# Patient Record
Sex: MALE | Race: WHITE | NOT HISPANIC OR LATINO | Employment: FULL TIME | URBAN - METROPOLITAN AREA
[De-identification: names, ages, dates, MRNs, and addresses within clinical notes are randomized per-mention and may not be internally consistent; named-entity substitution may affect disease eponyms.]

---

## 2019-03-14 NOTE — PROGRESS NOTES
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     Seen by Dr. Ritesh Medrano O.D. 2/2019 while living with sister in [place] for the past 2 years. Told he had glaucoma and cataracts.  Office #: [phone]  Fax #: [phone]    Currently back living in [place]. Noticing that he has some blurry vision, intermittent in both eyes. Current glasses over 3 years old. Lower part of the vision \"is not doing well\" in both eyes over the past 6 months.     Doesn't drive currently.     POH:  Last eye exam: 6/2016 - Dr. Velazquez at [place]    Prior eye surgery/laser: none  CTL wearer: 2 week disposables; does not sleep in lenses  Glasses: bifocals    Fam hx of eye disease: mother- glaucoma    -hx of OHTN left eye after trauma - reports multiple episodes of head trauma and black eyes as a teenager and young adult    (per prior records from [facility]):     -Tmax 29     -previous HVF's \"normal\"     -mother and maybe grandmother with +hx of glaucoma     -gonio (2012) - no angle recession     -pachy: 596 left eye     Gtts:  Lumigan at bedtime each eye -- ran out about 3 days ago and restarted some old latanoprost at bedtime each eye    All:  NKDA    A/P:  1. Ocular hypertension, left eye   -diagnosed as ocular hypertension left eye 2/2 prior head trauma by hx, although no angle recession noted on prior gonio exams  -C:D asymmetry left eye>OD with ? Hx of head trauma  -OCT nerve shows inferior thinning left eye; normal right eye -- suggestive of glaucomatous changes  -IOP higher left eye (21) vs 15 today right eye -- on latanoprost each eye  -Tmax per prior records 29 off gtts left eye  -pachy thick each eye  -positive fam hx   -will obtain baseline gonio, HVF 24-2 at next visit - no dilation -- to determine status of glaucoma    2. Nuclear sclerosis, each eye  -visually significant left eye>OD  -monitor for now until " glaucoma status determined after additional testing    3. Hypertension  -mild attenuation of retinal vessels each eye with no CWS, heme   -recommend tight BP control  -f/u with PCP, med compliance reviewed  -annual DFE      F/u 4-6 weeks for HVF 24-2, gonio - no dilation.    Lynnette Buchanan MD  PGY-5, Cornea Fellow  Ophthalmology    Complete documentation of historical and exam elements from today's encounter can be found in the full encounter summary report (not reduplicated in this progress note). I personally obtained the chief complaint(s) and history of present illness.  I confirmed and edited as necessary the review of systems, past medical/surgical history, family history, social history, and examination findings as documented by others; and I examined the patient myself. I personally reviewed the relevant tests, images, and reports as documented above. I formulated and edited as necessary the assessment and plan and discussed the findings and management plan with the patient and family. I was present for all procedures performed during this visit.    Lynnette Buchanan MD  Cornea Fellow

## 2019-03-15 ENCOUNTER — OFFICE VISIT (OUTPATIENT)
Dept: OPHTHALMOLOGY | Facility: CLINIC | Age: 59
End: 2019-03-15
Attending: OPHTHALMOLOGY
Payer: MEDICAID

## 2019-03-15 ENCOUNTER — ANCILLARY PROCEDURE (OUTPATIENT)
Dept: GENERAL RADIOLOGY | Facility: CLINIC | Age: 59
End: 2019-03-15
Attending: FAMILY MEDICINE
Payer: COMMERCIAL

## 2019-03-15 ENCOUNTER — OFFICE VISIT (OUTPATIENT)
Dept: ORTHOPEDICS | Facility: CLINIC | Age: 59
End: 2019-03-15
Payer: COMMERCIAL

## 2019-03-15 DIAGNOSIS — H25.13 NUCLEAR SCLEROTIC CATARACT OF BOTH EYES: ICD-10-CM

## 2019-03-15 DIAGNOSIS — H40.1120 PRIMARY OPEN ANGLE GLAUCOMA OF LEFT EYE, UNSPECIFIED GLAUCOMA STAGE: ICD-10-CM

## 2019-03-15 DIAGNOSIS — M54.12 CERVICAL RADICULOPATHY: Primary | ICD-10-CM

## 2019-03-15 DIAGNOSIS — H40.051 BORDERLINE GLAUCOMA OF RIGHT EYE WITH OCULAR HYPERTENSION: Primary | ICD-10-CM

## 2019-03-15 PROBLEM — B19.20 HEPATITIS C: Status: ACTIVE | Noted: 2019-03-15

## 2019-03-15 PROCEDURE — 76514 ECHO EXAM OF EYE THICKNESS: CPT | Mod: ZF | Performed by: OPHTHALMOLOGY

## 2019-03-15 PROCEDURE — G0463 HOSPITAL OUTPT CLINIC VISIT: HCPCS | Mod: ZF

## 2019-03-15 PROCEDURE — 92133 CPTRZD OPH DX IMG PST SGM ON: CPT | Mod: ZF | Performed by: OPHTHALMOLOGY

## 2019-03-15 RX ORDER — AMLODIPINE BESYLATE 10 MG/1
TABLET ORAL
COMMUNITY
Start: 2017-02-21

## 2019-03-15 RX ORDER — PREDNISONE 20 MG/1
40 TABLET ORAL DAILY
Qty: 10 TABLET | Refills: 0 | Status: SHIPPED | OUTPATIENT
Start: 2019-03-15 | End: 2019-04-02

## 2019-03-15 RX ORDER — LATANOPROST 50 UG/ML
1 SOLUTION/ DROPS OPHTHALMIC DAILY
Qty: 1 BOTTLE | Refills: 3 | Status: SHIPPED | OUTPATIENT
Start: 2019-03-15 | End: 2019-06-18

## 2019-03-15 RX ORDER — ATORVASTATIN CALCIUM 20 MG/1
20 TABLET, FILM COATED ORAL EVERY EVENING
COMMUNITY
Start: 2016-07-26

## 2019-03-15 ASSESSMENT — REFRACTION_WEARINGRX
OS_CYLINDER: SPHERE
OS_ADD: +1.50
OD_SPHERE: -6.75
OD_CYLINDER: +0.50
OS_SPHERE: -6.25
OD_AXIS: 135
OD_ADD: +1.50

## 2019-03-15 ASSESSMENT — VISUAL ACUITY
CORRECTION_TYPE: GLASSES
OS_CC+: -2
OS_CC: 20/60
METHOD: SNELLEN - LINEAR
OD_CC: 20/20
OS_PH_CC: 20/40

## 2019-03-15 ASSESSMENT — EXTERNAL EXAM - RIGHT EYE: OD_EXAM: NORMAL

## 2019-03-15 ASSESSMENT — CONF VISUAL FIELD
OS_NORMAL: 1
METHOD: COUNTING FINGERS
OD_NORMAL: 1

## 2019-03-15 ASSESSMENT — SLIT LAMP EXAM - LIDS
COMMENTS: NORMAL
COMMENTS: NORMAL

## 2019-03-15 ASSESSMENT — TONOMETRY
IOP_METHOD: TONOPEN
OS_IOP_MMHG: 21
OD_IOP_MMHG: 15

## 2019-03-15 ASSESSMENT — EXTERNAL EXAM - LEFT EYE: OS_EXAM: NORMAL

## 2019-03-15 ASSESSMENT — CUP TO DISC RATIO
OD_RATIO: 0.3
OS_RATIO: 0.6

## 2019-03-15 ASSESSMENT — PACHYMETRY
OD_CT(UM): 592
OS_CT(UM): 601

## 2019-03-15 NOTE — PATIENT INSTRUCTIONS
Eye Drop Instructions:    1. RESTART Latanoprost (Xalatan - green top) - 1 drop in the left eye once daily.  2. START Preservative-free lubricating tears (i.e Refresh) - 1 drop every 3-4 hours, as needed for irritation/dryness or discomfort.

## 2019-03-15 NOTE — PROGRESS NOTES
"[facility]  Orthopedics  Balaji Asencio MD  2019     Name: Stas Houston  MRN: [id number]  Age: 58 year old  : 1960  Referring provider: Referred Self     Chief Complaint: Left shoulder pain    History of Present Illness:   Stas Houston is a 58 year old, right handed male with a past medical history of shoulder impingement and a this is rthroscopy in 2018 who presents today for evaluation of his left shoulder pain with some radicular symptoms into his forearm. His pain is aching and shooting in nature, and it gets worse in the AM. He has some pain at the volar aspect of his hand. His pain is exacerbated with movement, overhead motion, and when he has excess clothes on due to the weight of the clothes. His pain is slightly relieved with rest and light movement. He admits to stiff necks and some nerve pain from sleeping \"wrong\" but not neck pain specifically. He has taken Ibuprofen for pain relief with some relief at first He has not tried PT or a CSI.    Review of Systems:   A 10-point review of systems was obtained and is negative except for as noted in the HPI.     Medications:   Current Outpatient Medications:      latanoprost (XALATAN) 0.005 % ophthalmic solution, Place 1 drop Into the left eye daily, Disp: 1 Bottle, Rfl: 3    Allergies:  Pollen extract    Past Medical History:  Glaucoma suspect  Ocular hypertension    Past Surgical History:  The patient does not have any pertinent past surgical history.    Social History:  The patient is a current everyday smoker.    Family History:  Glaucoma ([place])    Physical Examination:  Alert, pleasant and conversational    General: alert, pleasant, no distress  Neck/Spine: no TTP of spinous processes in cervical spine.  Pain with neck extension.  R OM limited in tilting and rotation of the neck.  Negative TTP along paraspinous muscles and upper trapezius musculature. negative Periscapular pain.  negative tightness in this area. No noted bruising or skin " discoloration. Spurlings negative  Neurologic: SILT throughout upper extremities. Strength 5/5 and symmetric throughout upper extremities.   Upper Extremities: warm, well perfused, no edema. Full ROM without pain in shoulder, elbow, wrist, and hand. Good capillary refill bilaterally    Imaging:   XR cervical spine - 2/3 views (3/15/19)  Impression:  Severe degenerative arthritic changes in the mid cervical spine more  pronounced on the right. Anterolisthesis of C4 on C5 as well as C5 on  C6.    I have independently reviewed the above imaging studies; the results were discussed with the patient.     Assessment:   58 year old, right handed male with left shoulder pain suspected to be radicular in etiology.    Plan:   Reviewed imaging and assessment with the patient in detail  Recommended course of treatment with prednisone.  Given home exercises.  We will discuss physical therapy referral if he is unable to improve on his own  If there is no improvement in symptoms after 1 week, consider MRI for further evaluation of cervical spine.    Balaji Asencio MD      Scribe Disclosure:   I, Grady Campbell, am serving as a scribe to document services personally performed by Balaji Asencio MD at this visit, based upon the provider's statements to me. All documentation has been reviewed by the aforementioned provider prior to being entered into the official medical record.

## 2019-03-15 NOTE — PROGRESS NOTES
SPORTS & ORTHOPEDIC WALK-IN VISIT 3/15/2019    Primary Care Physician: Dr. English  Hx of shoulder impingement had shoulder scope in 2/2018  Has not done PT. Had not had a hx of CSI  Pain is worse in the AM, does have some radicul s/s    Reason for visit:     What part of your body is injured / painful?  left shoulder    What caused the injury /pain? No inciting event     How long ago did your injury occur or pain begin? problem is longstanding    What are your most bothersome symptoms? Pain    How would you characterize your symptom?  aching and shooting    What makes your symptoms better? Rest    What makes your symptoms worse? Movement and Overhead motion    Have you been previously seen for this problem? Yes, [place]    Medical History:    Any recent changes to your medical history? No    Any new medication prescribed since last visit? No    Have you had surgery on this body part before? Yes    Social History:    Occupation: - [employer]    Handedness: Right      Review of Systems:    Do you have fever, chills, weight loss? No    Do you have any vision problems? No    Do you have any chest pain or edema? No    Do you have any shortness of breath or wheezing?  No    Do you have stomach problems? No    Do you have any numbness or focal weakness? No    Do you have diabetes? No    Do you have problems with bleeding or clotting? No    Do you have an rashes or other skin lesions? No

## 2019-03-15 NOTE — NURSING NOTE
Chief Complaints and History of Present Illnesses   Patient presents with     Yearly Exam     Chief Complaint(s) and History of Present Illness(es)     Yearly Exam     Laterality: both eyes    Onset: gradual    Onset: years ago    Quality: Feels that the va has decreased      Frequency: constantly    Associated symptoms: Negative for floaters, flashes, dryness and tearing    Pain scale: 0/10              Comments     No glare  Krystle DE LEON 12:39 PM March 15, 2019

## 2019-03-18 ENCOUNTER — DOCUMENTATION ONLY (OUTPATIENT)
Dept: CARE COORDINATION | Facility: CLINIC | Age: 59
End: 2019-03-18

## 2019-04-02 ENCOUNTER — ANCILLARY PROCEDURE (OUTPATIENT)
Dept: MRI IMAGING | Facility: CLINIC | Age: 59
End: 2019-04-02
Attending: FAMILY MEDICINE
Payer: COMMERCIAL

## 2019-04-02 ENCOUNTER — OFFICE VISIT (OUTPATIENT)
Dept: ORTHOPEDICS | Facility: CLINIC | Age: 59
End: 2019-04-02
Payer: COMMERCIAL

## 2019-04-02 VITALS
DIASTOLIC BLOOD PRESSURE: 76 MMHG | BODY MASS INDEX: 28.12 KG/M2 | WEIGHT: 175 LBS | HEIGHT: 66 IN | SYSTOLIC BLOOD PRESSURE: 126 MMHG

## 2019-04-02 DIAGNOSIS — M54.12 CERVICAL RADICULOPATHY: ICD-10-CM

## 2019-04-02 DIAGNOSIS — M54.12 CERVICAL RADICULOPATHY: Primary | ICD-10-CM

## 2019-04-02 RX ORDER — TRAMADOL HYDROCHLORIDE 50 MG/1
50 TABLET ORAL EVERY 6 HOURS PRN
Qty: 15 TABLET | Refills: 0 | Status: SHIPPED | OUTPATIENT
Start: 2019-04-02 | End: 2019-04-12

## 2019-04-02 ASSESSMENT — MIFFLIN-ST. JEOR: SCORE: 1566.54

## 2019-04-02 NOTE — PROGRESS NOTES
CHIEF COMPLAINT:  Pain of the Left Shoulder and RECHECK (Shoulder pain. )       HISTORY OF PRESENT ILLNESS  Mr. Houston is a pleasant 58 year old year old male who presents to clinic today for follow-up of bilateral arm pain.  He was originally seen by Dr. Asencio for symptoms consistent with cervical radiculopathy.  He had an MRI done about an hour ago that he would like to review.      Additional history: as documented    MEDICAL HISTORY  Patient Active Problem List   Diagnosis     Acid reflux     Altered mental status, unspecified altered mental status type     Anxiety disorder     Avoidant personality disorder (H)     Choroidal nevus     Essential hypertension     Glaucoma suspect, left eye     Hepatitis C     Astigmatism     Major depressive disorder, recurrent episode, mild (H)     Mild major depression (H)     Retinal lattice degeneration       Current Outpatient Medications   Medication Sig Dispense Refill     traMADol (ULTRAM) 50 MG tablet Take 1 tablet (50 mg) by mouth every 6 hours as needed for severe pain 15 tablet 0     amLODIPine (NORVASC) 10 MG tablet TAKE 1 TABLET BY MOUTH DAILY       atorvastatin (LIPITOR) 20 MG tablet Take 20 mg by mouth       latanoprost (XALATAN) 0.005 % ophthalmic solution Place 1 drop Into the left eye daily 1 Bottle 3       Allergies   Allergen Reactions     Pollen Extract Other (See Comments)       Family History   Problem Relation Age of Onset     Glaucoma Mother      Diabetes No family hx of        Additional medical/Social/Surgical histories reviewed in [facility] and updated as appropriate.     REVIEW OF SYSTEMS (4/2/2019)  CONSTITUTIONAL: Denies fever and weight loss  EYES: Denies acute vision changes  ENT: Denies hearing changes or difficulty swallowing  CARDIAC: Denies chest pain or edema  RESPIRATORY: Denies dyspnea, cough or wheeze  GASTROINTESTINAL: Denies abdominal pain, nausea, vomiting  MUSCULOSKELETAL: See HPI  SKIN: Denies any recent rash or lesion  NEUROLOGICAL:  "Denies numbness or focal weakness  PSYCHIATRIC: No history of psychiatric symptoms or problems  ENDOCRINE: Denies current diagnosis of diabetes  HEMATOLOGY: Denies episodes of easy bleeding      PHYSICAL EXAM  Vitals:    04/02/19 1457   BP: 126/76   Weight: 79.4 kg (175 lb)   Height: 1.676 m (5' 6\")     General  - normal appearance, in no obvious distress  CV  - normal peripheral perfusion  Pulm  - normal respiratory pattern, non-labored  Musculoskeletal - cervical spine  - inspection: leftward lateral prominence   - palpation: no paravertebral or bony tenderness  - ROM: pain with bilateral rotation and sidebending  - strength: upper extremities 5/5 in all planes    Neuro  - C5-7 DTRs 2+ bilaterally, no sensory or motor deficit, grossly normal coordination, normal muscle tone  Skin  - no ecchymosis, erythema, warmth, or induration, no obvious rash  Psych  - interactive, appropriate, normal mood and affect             ASSESSMENT & PLAN  Mr. Houston is a 58 year old year old male who presents to clinic today with bilateral arm pain consistent with cervical radiculopathy..    I reviewed his MR in the room with him which reads:  1. Multilevel cervical spondylosis, most pronounced at C4-5 with  moderate to severe spinal canal stenosis and at C3-4 with moderate  spinal canal stenosis.  2. There is Moderate to severe right neural foraminal stenosis at C4-5  and severe right neural foraminal stenosis at C3-4. Further  degenerative disease as above.  3. Levoconvex scoliosis of the cervical spine, with its apex at C3-4.    Stas and I had a good discussion regarding non-operative treatment for degenerative disc disease.  This included strengthening of the paraspinal and core muscles, weight control, and oral analgesics when needed.  We also discussed the role of epidural corticosteroid injections.    I am referring him for a cervical injection, specifically at C4-5 on the left.  He is going to follow-up with Dr. Asencio 2 weeks " after his injection.  If his symptoms are persistent we could attempt an injection at a subsequent level or contralateral side.    I also prescribed him a short course of tramadol.    It was a pleasure seeing Stas today.      Neto Islas DO, [facility]  Primary Care Sports Medicine

## 2019-04-02 NOTE — LETTER
4/2/2019       RE: Stas Houston  [address] [address]     Dear Colleague,    Thank you for referring your patient, Stas Houston, to the [facility] SPORTS AND ORTHOPAEDIC WALK IN CLINIC at [facility]. Please see a copy of my visit note below.    CHIEF COMPLAINT:  Pain of the Left Shoulder and RECHECK (Shoulder pain. )     HISTORY OF PRESENT ILLNESS  Mr. Houston is a pleasant 58 year old year old male who presents to clinic today for follow-up of bilateral arm pain.  He was originally seen by Dr. Asencio for symptoms consistent with cervical radiculopathy.  He had an MRI done about an hour ago that he would like to review.    Additional history: as documented    MEDICAL HISTORY  Patient Active Problem List   Diagnosis     Acid reflux     Altered mental status, unspecified altered mental status type     Anxiety disorder     Avoidant personality disorder (H)     Choroidal nevus     Essential hypertension     Glaucoma suspect, left eye     Hepatitis C     Astigmatism     Major depressive disorder, recurrent episode, mild (H)     Mild major depression (H)     Retinal lattice degeneration       Current Outpatient Medications   Medication Sig Dispense Refill     traMADol (ULTRAM) 50 MG tablet Take 1 tablet (50 mg) by mouth every 6 hours as needed for severe pain 15 tablet 0     amLODIPine (NORVASC) 10 MG tablet TAKE 1 TABLET BY MOUTH DAILY       atorvastatin (LIPITOR) 20 MG tablet Take 20 mg by mouth       latanoprost (XALATAN) 0.005 % ophthalmic solution Place 1 drop Into the left eye daily 1 Bottle 3       Allergies   Allergen Reactions     Pollen Extract Other (See Comments)       Family History   Problem Relation Age of Onset     Glaucoma Mother      Diabetes No family hx of        Additional medical/Social/Surgical histories reviewed in [facility] and updated as appropriate.     REVIEW OF SYSTEMS (4/2/2019)  CONSTITUTIONAL: Denies fever and weight loss  EYES:  "Denies acute vision changes  ENT: Denies hearing changes or difficulty swallowing  CARDIAC: Denies chest pain or edema  RESPIRATORY: Denies dyspnea, cough or wheeze  GASTROINTESTINAL: Denies abdominal pain, nausea, vomiting  MUSCULOSKELETAL: See HPI  SKIN: Denies any recent rash or lesion  NEUROLOGICAL: Denies numbness or focal weakness  PSYCHIATRIC: No history of psychiatric symptoms or problems  ENDOCRINE: Denies current diagnosis of diabetes  HEMATOLOGY: Denies episodes of easy bleeding      PHYSICAL EXAM  Vitals:    04/02/19 1457   BP: 126/76   Weight: 79.4 kg (175 lb)   Height: 1.676 m (5' 6\")     General  - normal appearance, in no obvious distress  CV  - normal peripheral perfusion  Pulm  - normal respiratory pattern, non-labored  Musculoskeletal - cervical spine  - inspection: leftward lateral prominence   - palpation: no paravertebral or bony tenderness  - ROM: pain with bilateral rotation and sidebending  - strength: upper extremities 5/5 in all planes    Neuro  - C5-7 DTRs 2+ bilaterally, no sensory or motor deficit, grossly normal coordination, normal muscle tone  Skin  - no ecchymosis, erythema, warmth, or induration, no obvious rash  Psych  - interactive, appropriate, normal mood and affect    ASSESSMENT & PLAN  Mr. Houston is a 58 year old year old male who presents to clinic today with bilateral arm pain consistent with cervical radiculopathy..    I reviewed his MR in the room with him which reads:  1. Multilevel cervical spondylosis, most pronounced at C4-5 with  moderate to severe spinal canal stenosis and at C3-4 with moderate  spinal canal stenosis.  2. There is Moderate to severe right neural foraminal stenosis at C4-5  and severe right neural foraminal stenosis at C3-4. Further  degenerative disease as above.  3. Levoconvex scoliosis of the cervical spine, with its apex at C3-4.    Stas and I had a good discussion regarding non-operative treatment for degenerative disc disease.  This included " strengthening of the paraspinal and core muscles, weight control, and oral analgesics when needed.  We also discussed the role of epidural corticosteroid injections.    I am referring him for a cervical injection, specifically at C4-5 on the left.  He is going to follow-up with Dr. Asencio 2 weeks after his injection.  If his symptoms are persistent we could attempt an injection at a subsequent level or contralateral side.    I also prescribed him a short course of tramadol.    It was a pleasure seeing Stas today.    Neto Islas DO, [facility]  Primary Care Sports Medicine          SPORTS & ORTHOPEDIC WALK-IN FOLLOW-UP VISIT 4/2/2019    Patient is here after getting a cervical MRI. States his Left shoulder is painful. Pain radiates down left arm into hand. All fingers are affected. Pain also moves across shoulders/ upper back into the Right shoulder.   Interval History:     Follow up reason: Kamryn English    Date of injury: N/A    Date last seen: 3/15/19 with Dr. Asencio    Following Therapeutic Plan: Yes     Pain: Worsening    Function: Worsening    Medical History:    Any recent changes to your medical history? No    Any new medication prescribed since last visit? No    Review of Systems:    Do you have fever, chills, weight loss? No    Do you have any vision problems? No    Do you have any chest pain or edema? No    Do you have any shortness of breath or wheezing?  No    Do you have stomach problems? No    Do you have any numbness or focal weakness? No    Do you have diabetes? No    Do you have problems with bleeding or clotting? No    Do you have an rashes or other skin lesions? No

## 2019-04-02 NOTE — LETTER
April 2, 2019    RE: Stas Houston  [address] [place] [address]        Dear  or ,    Mr. Houston is under my professional care for a neck condition.    With regards to his medications, he is no longer taking lumigan or prednisone.    Regards,            John Paul Islas DO [facility]

## 2019-04-02 NOTE — PROGRESS NOTES
SPORTS & ORTHOPEDIC WALK-IN FOLLOW-UP VISIT 4/2/2019    Patient is here after getting a cervical MRI. States his Left shoulder is painful. Pain radiates down left arm into hand. All fingers are affected. Pain also moves across shoulders/ upper back into the Right shoulder.   Interval History:     Follow up reason: Kamryn English    Date of injury: N/A    Date last seen: 3/15/19 with Dr. Asencio    Following Therapeutic Plan: Yes     Pain: Worsening    Function: Worsening    Medical History:    Any recent changes to your medical history? No    Any new medication prescribed since last visit? No    Review of Systems:    Do you have fever, chills, weight loss? No    Do you have any vision problems? No    Do you have any chest pain or edema? No    Do you have any shortness of breath or wheezing?  No    Do you have stomach problems? No    Do you have any numbness or focal weakness? No    Do you have diabetes? No    Do you have problems with bleeding or clotting? No    Do you have an rashes or other skin lesions? No

## 2019-04-12 ENCOUNTER — OFFICE VISIT (OUTPATIENT)
Dept: ORTHOPEDICS | Facility: CLINIC | Age: 59
End: 2019-04-12
Payer: COMMERCIAL

## 2019-04-12 VITALS — WEIGHT: 175 LBS | HEIGHT: 66 IN | RESPIRATION RATE: 16 BRPM | BODY MASS INDEX: 28.12 KG/M2

## 2019-04-12 DIAGNOSIS — M54.12 CERVICAL RADICULOPATHY: Primary | ICD-10-CM

## 2019-04-12 RX ORDER — HYDROCORTISONE 2.5 %
CREAM (GRAM) TOPICAL
Status: ON HOLD | COMMUNITY
Start: 2019-03-21 | End: 2019-05-03

## 2019-04-12 RX ORDER — DULOXETIN HYDROCHLORIDE 60 MG/1
60 CAPSULE, DELAYED RELEASE ORAL
COMMUNITY
Start: 2019-04-02 | End: 2019-05-07

## 2019-04-12 RX ORDER — GABAPENTIN 300 MG/1
CAPSULE ORAL
COMMUNITY
Start: 2019-03-21

## 2019-04-12 RX ORDER — TRAMADOL HYDROCHLORIDE 50 MG/1
50 TABLET ORAL EVERY 6 HOURS PRN
Qty: 20 TABLET | Refills: 0 | Status: ON HOLD | OUTPATIENT
Start: 2019-04-12 | End: 2019-05-03

## 2019-04-12 ASSESSMENT — MIFFLIN-ST. JEOR: SCORE: 1566.54

## 2019-04-12 NOTE — PROGRESS NOTES
"[facility]  Orthopedics  Balaji Asencio MD  2019     Name: Stas Houston  MRN: [id number]  Age: 58 year old  : 1960  Referring provider: Referred Self     Chief Complaint: Neck pain follow-up    History of Present Illness:   Stas Houston is a 58 year old, male with a past medical history of shoulder impingement and arthroscopy in 2018 who presents today for follow-up regarding his left neck and shoulder pain consistent with cervical radiculopathy. The patient was last evaluated by Dr. Islas on 19, at which time they discussed his MRI results and he referred to receive a cervical injection at C4-5 on left. Today, the patient reports that he has been doing well on Tramadol and has gone back to work. He continues a similar pain as before in his neck and shoulder which is exacerbated with left shoulder movement. The patient also would like to refill his Tramadol prescription which he takes 1/day in the morning. He also has began using Ibuprofen, and he uses Gabapentin for anxiety.     Review of Systems:   A 10-point review of systems was obtained and is negative except for as noted in the HPI.     Physical Examination:  Resp. rate 16, height 1.676 m (5' 6\"), weight 79.4 kg (175 lb).  Deferred exam.     Imaging:  MRI cervical spine w/o contrast (19)  1. Multilevel cervical spondylosis, most pronounced at C4-5 with  moderate to severe spinal canal stenosis and at C3-4 with moderate  spinal canal stenosis.  2. There is Moderate to severe right neural foraminal stenosis at C4-5  and severe right neural foraminal stenosis at C3-4. Further  degenerative disease as above.  3. Levoconvex scoliosis of the cervical spine, with its apex at C3-4.  Per radiology.    I have independently reviewed the above imaging studies; the results were discussed with the patient.     Assessment:   58 year old, male with left neck and shoulder pain with bilateral arm (L>R) radiculopathy consistent with cervical " radiculopathy    Plan:   -Referred to PT for neck and shoulder exercises   -Refilled Tramadol prescription for until his neck injection. No further refills after that time.     Balaji Asencio MD       Scribe Disclosure:   I, Grady Campbell, am serving as a scribe to document services personally performed by Balaji Asencio MD at this visit, based upon the provider's statements to me. All documentation has been reviewed by the aforementioned provider prior to being entered into the official medical record.

## 2019-04-12 NOTE — PROGRESS NOTES
SPORTS & ORTHOPEDIC WALK-IN FOLLOW-UP VISIT 4/12/2019    Interval History:     Follow up reason: neck pain    Date of injury: NA    Date last seen: 4/2/19 Islas    Following Therapeutic Plan: Yes     Pain: Improving    Function: Improving    Interval History: Improving with the Tramadol. Pain is manageable. Goes to work with the Tramadol. Wondering about a refill.     Medical History:    Any recent changes to your medical history? No    Any new medication prescribed since last visit? No    Review of Systems:    Do you have fever, chills, weight loss? No    Do you have any vision problems? No    Do you have any chest pain or edema? No    Do you have any shortness of breath or wheezing?  No    Do you have stomach problems? No    Do you have any numbness or focal weakness? No    Do you have diabetes? No    Do you have problems with bleeding or clotting? No    Do you have an rashes or other skin lesions? No

## 2019-04-12 NOTE — LETTER
2019       RE: Stas Houston  [address] [address]     Dear Colleague,    Thank you for referring your patient, Stas Houston, to the [facility] SPORTS AND ORTHOPAEDIC WALK IN CLINIC at [facility]. Please see a copy of my visit note below.          SPORTS & ORTHOPEDIC WALK-IN FOLLOW-UP VISIT 2019    Interval History:     Follow up reason: neck pain    Date of injury: NA    Date last seen: 19 Roshan    Following Therapeutic Plan: Yes     Pain: Improving    Function: Improving    Interval History: Improving with the Tramadol. Pain is manageable. Goes to work with the Tramadol. Wondering about a refill.     Medical History:    Any recent changes to your medical history? No    Any new medication prescribed since last visit? No    Review of Systems:    Do you have fever, chills, weight loss? No    Do you have any vision problems? No    Do you have any chest pain or edema? No    Do you have any shortness of breath or wheezing?  No    Do you have stomach problems? No    Do you have any numbness or focal weakness? No    Do you have diabetes? No    Do you have problems with bleeding or clotting? No    Do you have an rashes or other skin lesions? No               [facility]  Orthopedics  Balaji Asencio MD  2019     Name: Stas Houston  MRN: [id number]  Age: 58 year old  : 1960  Referring provider: Referred Self     Chief Complaint: Neck pain follow-up    History of Present Illness:   Stas Houston is a 58 year old, male with a past medical history of shoulder impingement and arthroscopy in 2018  who presents today for follow-up regarding his left neck and shoulder pain consistent with cervical radiculopathy. The patient was last evaluated by Dr. Islas on 19, at which time they discussed his MRI results and he referred to receive a cervical injection at C4-5 on left. Today, the patient reports that he has been doing well on Tramadol  "and has gone back to work. He continues a similar pain as before in his neck and shoulder which is exacerbated with left shoulder movement. The patient also would like to refill his Tramadol prescription which he takes 1/day in the morning. He also has began using Ibuprofen, and he uses Gabapentin for anxiety.     Review of Systems:   A 10-point review of systems was obtained and is negative except for as noted in the HPI.     Physical Examination:  Resp. rate 16, height 1.676 m (5' 6\"), weight 79.4 kg (175 lb).  Deferred exam.     Imaging:  MRI cervical spine w/o contrast (4/2/19)  1. Multilevel cervical spondylosis, most pronounced at C4-5 with  moderate to severe spinal canal stenosis and at C3-4 with moderate  spinal canal stenosis.  2. There is Moderate to severe right neural foraminal stenosis at C4-5  and severe right neural foraminal stenosis at C3-4. Further  degenerative disease as above.  3. Levoconvex scoliosis of the cervical spine, with its apex at C3-4.  Per radiology.    I have independently reviewed the above imaging studies; the results were discussed with the patient.     Assessment:   58 year old, male with left neck and shoulder pain with bilateral arm (L>R) radiculopathy consistent with cervical radiculopathy    Plan:   -Referred to PT for neck and shoulder exercises   -Refilled Tramadol prescription for until his neck injection. No further refills after that time.     Balaji Asencio MD       Scribe Disclosure:   I, Grady Campbell, am serving as a scribe to document services personally performed by Balaji Asencio MD at this visit, based upon the provider's statements to me. All documentation has been reviewed by the aforementioned provider prior to being entered into the official medical record.          Again, thank you for allowing me to participate in the care of your patient.      Sincerely,    Balaji Asencio MD      "

## 2019-04-20 ENCOUNTER — OFFICE VISIT (OUTPATIENT)
Dept: OPHTHALMOLOGY | Facility: CLINIC | Age: 59
End: 2019-04-20
Payer: COMMERCIAL

## 2019-04-20 DIAGNOSIS — H40.052 BORDERLINE GLAUCOMA OF LEFT EYE WITH OCULAR HYPERTENSION: ICD-10-CM

## 2019-04-20 DIAGNOSIS — H25.13 NUCLEAR SCLEROTIC CATARACT OF BOTH EYES: Primary | ICD-10-CM

## 2019-04-20 ASSESSMENT — GONIOSCOPY
OS_TEMPORAL: OPEN TO CBB
OS_INFERIOR: OPEN TO CBB
OS_NASAL: OPEN TO CBB
OD_SUPERIOR: OPEN TO CBB
OS_SUPERIOR: OPEN TO CBB
OD_INFERIOR: OPEN TO CBB
OD_TEMPORAL: OPEN TO CBB
OD_NASAL: OPEN TO CBB

## 2019-04-20 ASSESSMENT — EXTERNAL EXAM - RIGHT EYE: OD_EXAM: NORMAL

## 2019-04-20 ASSESSMENT — SLIT LAMP EXAM - LIDS
COMMENTS: NORMAL
COMMENTS: NORMAL

## 2019-04-20 ASSESSMENT — PACHYMETRY
OS_CT(UM): 601
OD_CT(UM): 592

## 2019-04-20 ASSESSMENT — REFRACTION_WEARINGRX
OS_ADD: +1.50
OS_CYLINDER: SPHERE
OD_ADD: +1.50
OD_CYLINDER: +0.50
OS_SPHERE: -6.25
OS_SPHERE: -6.25
OS_CYLINDER: SPHERE
OD_AXIS: 135
OD_SPHERE: -6.75
OD_ADD: +1.50
OS_ADD: +1.50
OD_SPHERE: -6.75
OD_AXIS: 135
OD_CYLINDER: +0.50

## 2019-04-20 ASSESSMENT — EXTERNAL EXAM - LEFT EYE: OS_EXAM: NORMAL

## 2019-04-20 ASSESSMENT — TONOMETRY
IOP_METHOD: ICARE
OD_IOP_MMHG: 20
OS_IOP_MMHG: 16

## 2019-04-20 ASSESSMENT — VISUAL ACUITY
METHOD: SNELLEN - LINEAR
OD_CC: 20/20
OS_CC+: -2
CORRECTION_TYPE: GLASSES
OS_CC: 20/60

## 2019-04-20 NOTE — NURSING NOTE
Patient presents for 4-6wk f/u of Borderline glaucoma of right eye with ocular hypertension with Gonio and 24-2 VF. The current vision is stable. No pain, discomfort, no redness itching or tears. No f/f. Consistent with rx eye drops. Alba DE LEON 10:13 AM April 20, 2019

## 2019-04-20 NOTE — PROGRESS NOTES
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     Seen by Dr. Ritesh Merdano O.D. 2/2019 while living with sister in [place] for the past 2 years. Told he had glaucoma and cataracts.  Office #: [phone]  Fax #: [phone]    Currently back living in [place]. Noticing that he has some blurry vision, intermittent in both eyes. Current glasses over 3 years old. Lower part of the vision \"is not doing well\" in both eyes over the past 6 months.     Doesn't drive currently.     Interval: Here for 4-6 week follow up for additional glaucoma testing. Still bothered by decreased vision left eye.     POH:  Last eye exam: 6/2016 - Dr. Velazquez at [facility]    Prior eye surgery/laser: none  CTL wearer: 2 week disposables; does not sleep in lenses  Glasses: bifocals    Fam hx of eye disease: mother- glaucoma    -hx of OHTN left eye after trauma - reports multiple episodes of head trauma and black eyes as a teenager and young adult    (per prior records from [facility]):     -Tmax 29     -previous HVF's \"normal\"     -mother and maybe grandmother with +hx of glaucoma     -gonio (2012) - no angle recession     -pachy: 596 left eye     Glaucoma work up/testing:   -C:D (DFE 3/15/19): 0.3 right eye, 0.6 left eye    -Tmax (on lumigan/latanoprost): 20, 21   -pachy: 592/601   -positive fam hx of glaucoma   -gonio open each eye   -HVF 24-2 (4/20/19): 1st test, few non-specific defects   -OCT nerve (3/15/19): right eye: G360, left eye: inf thinning, otherwise green    Gtts:  Latanoprost at bedtime OU    All:  NKDA    A/P:  1. Glaucoma suspect, OU  -diagnosed as ocular hypertension left eye 2/2 prior head trauma by hx; also with C:D asymmetry (0.4 vs. 0.6)  -OCT nerve (3/15/19) - shows inferior thinning left eye; normal right eye  -Tmax per prior records 29 off gtts left eye  -IOP today 20, 16  -pachy thick each eye  -positive " fam hx   -HVF 24-2 (4/20/19): initial test, few non-specific defects; monitor  -gonio (4/20/19): open to CBB each eye 360 with few inf PAS left eye; no angle recession; can visualize peripheral iris vessels, but no heme/hyphema/branching vessels of concern; likely visualized due to light iris color -appear symmetric each eye.  -given low risk of latanoprost and improvement of IOP from 29 --> 16, would continue latanoprost at bedtime each eye  -recommend repeat OCT nerve/HVF in 6 months    2. Nuclear sclerosis, each eye  -visually significant left eye>OD  -pt bothered by decreased vision  -plan for CE/IOL left eye first  -target: emmetropia, pt aware he will wear reading glasses after surgery  -although mild NS right eye, will have disruption from aniseikonia, so will proceed with right eye CE/IOL a few weeks after left eye CE/IOL  -R/B/A reviewed  -30 minutes, MAC/top  -Corie to call and schedule  -pt will get IOL calcs at [facility] next week    3. Hypertension  -mild attenuation of retinal vessels each eye with no CWS, heme   -recommend tight BP control  -f/u with PCP, med compliance reviewed  -annual DFE    IOL calcs at [facility] next week  Corie to call and schedule - CE/IOL left eye first    Lynnette Buchanan MD  PGY-5, Cornea Fellow  Ophthalmology    Complete documentation of historical and exam elements from today's encounter can be found in the full encounter summary report (not reduplicated in this progress note). I personally obtained the chief complaint(s) and history of present illness.  I confirmed and edited as necessary the review of systems, past medical/surgical history, family history, social history, and examination findings as documented by others; and I examined the patient myself. I personally reviewed the relevant tests, images, and reports as documented above. I formulated and edited as necessary the assessment and plan and discussed the findings and management plan with the patient and family. I was  present for all procedures performed during this visit.    Lynnette Buchanan MD  Cornea Fellow

## 2019-04-22 ENCOUNTER — ALLIED HEALTH/NURSE VISIT (OUTPATIENT)
Dept: OPHTHALMOLOGY | Facility: CLINIC | Age: 59
End: 2019-04-22
Attending: OPHTHALMOLOGY
Payer: COMMERCIAL

## 2019-04-22 DIAGNOSIS — H26.9 CATARACT: Primary | ICD-10-CM

## 2019-04-22 PROCEDURE — 40000269 ZZH STATISTIC NO CHARGE FACILITY FEE: Mod: ZF

## 2019-04-22 NOTE — PROGRESS NOTES
IOL calcs reviewed and acceptable for surgery.     To see Corie today for surgical scheduling.    Lynnette Buchanan MD  PGY-5, Cornea Fellow  Ophthalmology

## 2019-04-23 ENCOUNTER — TELEPHONE (OUTPATIENT)
Dept: OPHTHALMOLOGY | Facility: CLINIC | Age: 59
End: 2019-04-23

## 2019-04-23 NOTE — TELEPHONE ENCOUNTER
Patient is scheduled for surgery with Dr. Buchanan    Spoke or left message with: patient    Date of Surgery: 5/8/19 and 5/29/19    Location: [facility]    Informed patient they will need an adult  Yes    Pre-op with surgeon (if applicable): DERIAN    H&P: Scheduled with PAC unit eval on 4/30/19    Additional imaging/appointments: Post op appointment scheduled.    Surgery packet: mailed to patient 4/23/19    Additional comments: Advised RN will call 1 - 3 days prior to each surgery date with arrival time and instructions.

## 2019-04-24 ENCOUNTER — THERAPY VISIT (OUTPATIENT)
Dept: PHYSICAL THERAPY | Facility: CLINIC | Age: 59
End: 2019-04-24
Payer: COMMERCIAL

## 2019-04-24 DIAGNOSIS — M54.2 CERVICAL PAIN: ICD-10-CM

## 2019-04-24 DIAGNOSIS — M54.12 CERVICAL RADICULOPATHY: ICD-10-CM

## 2019-04-24 PROCEDURE — 97161 PT EVAL LOW COMPLEX 20 MIN: CPT | Mod: GP | Performed by: PHYSICAL THERAPIST

## 2019-04-24 PROCEDURE — 97110 THERAPEUTIC EXERCISES: CPT | Mod: GP | Performed by: PHYSICAL THERAPIST

## 2019-04-24 NOTE — PROGRESS NOTES
[facility] Initial Evaluation  Subjective:  [facility]                  Physical Therapy Initial Examination/Evaluation  April 24, 2019    Stas Houston is a 59 year old male referred to physical therapy by Dr. Asencio for treatment of cervical pain-left lateral shoulder that can travel down the arm into hand, tingling and needle poking with Precautions/Restrictions/MD instructions none      Subjective:  DOI/onset: 1/1/2013  Acute Injury or Gradual Onset?: Gradual injury over time  Mechanism of Injury: unknown  Related PMH: chem dep, depression, hepatitis, high blood pressure, smoking Previous Treatment: Nothing Effect of prior treatment: NA  Imaging: x-ray and MRI  Chief Complaint/Functional Limitations:   Pain upon awakening and see below in therapy evaluation codes   Pain: rest 2 /10, activity 6/10 Location: left shoulder, neck Frequency: Constant Described as: aching, sharp and tingling Alleviated by: Rest Progression of Symptoms: Gradually getting worse. 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, sweeping floors, light work  Current HEP/exercise regimen: nothing  Patient's goals are see chief complaints pain and weakness left arm and cervical region    Other pertinent PMH/Red Flags: Chemical Dependency, Hepatitis   Barriers at home/work: None as reported by patient  Pertinent Surgical History: none  Medications: gabapentin  General health as reported by patient: good  Return to MD:  6-8 weeks      Objective:  System  Obs: flexed sitting posture, signficant forward head posture  Cervical AROM is full with all motions pain with right lat flex on the left side-UT  Cervical myotomes  5/5 all UE levels  Physical Exam    General     ROS    Assessment/Plan:    Patient is a 59 year old male with cervical complaints.    Patient has the following significant findings with corresponding  treatment plan.                Diagnosis 1:  Cervical radiculopathy  Pain -  hot/cold therapy, manual therapy, self management, education and home program  Decreased function - therapeutic activities  Impaired posture - neuro re-education    Therapy Evaluation Codes:   1) History comprised of:   Personal factors that impact the plan of care:      None.    Comorbidity factors that impact the plan of care are:      Smoking.     Medications impacting care: None.  2) Examination of Body Systems comprised of:   Body structures and functions that impact the plan of care:      Cervical spine.   Activity limitations that impact the plan of care are:      Driving, Lifting and Sitting.  3) Clinical presentation characteristics are:   Stable/Uncomplicated.  4) Decision-Making    Low complexity using standardized patient assessment instrument and/or measureable assessment of functional outcome.  Cumulative Therapy Evaluation is: Low complexity.    Previous and current functional limitations:  (See Goal Flow Sheet for this information)    Short term and Long term goals: (See Goal Flow Sheet for this information)     Communication ability:  Patient appears to be able to clearly communicate and understand verbal and written communication and follow directions correctly.  Treatment Explanation - The following has been discussed with the patient:   RX ordered/plan of care  Anticipated outcomes  Possible risks and side effects  This patient would benefit from PT intervention to resume normal activities.   Rehab potential is good.    Frequency:  1 X week, once daily  Duration:  for 8 weeks  Discharge Plan:  Achieve all LTG.  Independent in home treatment program.  Reach maximal therapeutic benefit.    Please refer to the daily flowsheet for treatment today, total treatment time and time spent performing 1:1 timed codes.

## 2019-04-26 ENCOUNTER — TELEPHONE (OUTPATIENT)
Dept: OPHTHALMOLOGY | Facility: CLINIC | Age: 59
End: 2019-04-26

## 2019-05-01 ENCOUNTER — TELEPHONE (OUTPATIENT)
Dept: INTERVENTIONAL RADIOLOGY/VASCULAR | Facility: CLINIC | Age: 59
End: 2019-05-01

## 2019-05-03 ENCOUNTER — APPOINTMENT (OUTPATIENT)
Dept: MEDSURG UNIT | Facility: CLINIC | Age: 59
End: 2019-05-03
Attending: FAMILY MEDICINE
Payer: COMMERCIAL

## 2019-05-03 ENCOUNTER — HOSPITAL ENCOUNTER (OUTPATIENT)
Facility: CLINIC | Age: 59
Discharge: HOME | End: 2019-05-03
Attending: FAMILY MEDICINE | Admitting: FAMILY MEDICINE
Payer: COMMERCIAL

## 2019-05-03 ENCOUNTER — HOSPITAL ENCOUNTER (OUTPATIENT)
Dept: GENERAL RADIOLOGY | Facility: CLINIC | Age: 59
End: 2019-05-03
Attending: FAMILY MEDICINE | Admitting: FAMILY MEDICINE
Payer: COMMERCIAL

## 2019-05-03 ENCOUNTER — PRE VISIT (OUTPATIENT)
Facility: CLINIC | Age: 59
End: 2019-05-03

## 2019-05-03 VITALS
HEIGHT: 66 IN | SYSTOLIC BLOOD PRESSURE: 143 MMHG | OXYGEN SATURATION: 98 % | BODY MASS INDEX: 28.12 KG/M2 | DIASTOLIC BLOOD PRESSURE: 83 MMHG | WEIGHT: 175 LBS | HEART RATE: 68 BPM | TEMPERATURE: 97.6 F | RESPIRATION RATE: 16 BRPM

## 2019-05-03 DIAGNOSIS — M54.12 CERVICAL RADICULOPATHY: ICD-10-CM

## 2019-05-03 LAB
APTT PPP: 29 SEC (ref 22–37)
INR PPP: 1 (ref 0.86–1.14)
PLATELET # BLD AUTO: 237 10E9/L (ref 150–450)

## 2019-05-03 PROCEDURE — 85730 THROMBOPLASTIN TIME PARTIAL: CPT | Performed by: STUDENT IN AN ORGANIZED HEALTH CARE EDUCATION/TRAINING PROGRAM

## 2019-05-03 PROCEDURE — 62321 NJX INTERLAMINAR CRV/THRC: CPT

## 2019-05-03 PROCEDURE — 85610 PROTHROMBIN TIME: CPT | Performed by: STUDENT IN AN ORGANIZED HEALTH CARE EDUCATION/TRAINING PROGRAM

## 2019-05-03 PROCEDURE — 25000128 H RX IP 250 OP 636: Performed by: RADIOLOGY

## 2019-05-03 PROCEDURE — 25000125 ZZHC RX 250: Performed by: RADIOLOGY

## 2019-05-03 PROCEDURE — 25500064 ZZH RX 255 OP 636: Performed by: RADIOLOGY

## 2019-05-03 PROCEDURE — 40000166 ZZH STATISTIC PP CARE STAGE 1

## 2019-05-03 PROCEDURE — 85049 AUTOMATED PLATELET COUNT: CPT | Performed by: STUDENT IN AN ORGANIZED HEALTH CARE EDUCATION/TRAINING PROGRAM

## 2019-05-03 RX ORDER — LIDOCAINE HYDROCHLORIDE 10 MG/ML
10 INJECTION, SOLUTION EPIDURAL; INFILTRATION; INTRACAUDAL; PERINEURAL ONCE
Status: COMPLETED | OUTPATIENT
Start: 2019-05-03 | End: 2019-05-03

## 2019-05-03 RX ORDER — DEXTROSE MONOHYDRATE 25 G/50ML
25-50 INJECTION, SOLUTION INTRAVENOUS
Status: DISCONTINUED | OUTPATIENT
Start: 2019-05-03 | End: 2019-05-03 | Stop reason: HOSPADM

## 2019-05-03 RX ORDER — IOPAMIDOL 408 MG/ML
20 INJECTION, SOLUTION INTRATHECAL ONCE
Status: COMPLETED | OUTPATIENT
Start: 2019-05-03 | End: 2019-05-03

## 2019-05-03 RX ORDER — BETAMETHASONE SODIUM PHOSPHATE AND BETAMETHASONE ACETATE 3; 3 MG/ML; MG/ML
6 INJECTION, SUSPENSION INTRA-ARTICULAR; INTRALESIONAL; INTRAMUSCULAR; SOFT TISSUE ONCE
Status: COMPLETED | OUTPATIENT
Start: 2019-05-03 | End: 2019-05-03

## 2019-05-03 RX ORDER — NICOTINE POLACRILEX 4 MG
15-30 LOZENGE BUCCAL
Status: DISCONTINUED | OUTPATIENT
Start: 2019-05-03 | End: 2019-05-03 | Stop reason: HOSPADM

## 2019-05-03 RX ORDER — BETAMETHASONE SODIUM PHOSPHATE AND BETAMETHASONE ACETATE 3; 3 MG/ML; MG/ML
2 INJECTION, SUSPENSION INTRA-ARTICULAR; INTRALESIONAL; INTRAMUSCULAR; SOFT TISSUE ONCE
Status: DISCONTINUED | OUTPATIENT
Start: 2019-05-03 | End: 2019-05-03

## 2019-05-03 RX ADMIN — BETAMETHASONE SODIUM PHOSPHATE AND BETAMETHASONE ACETATE 3 ML: 3; 3 INJECTION, SUSPENSION INTRA-ARTICULAR; INTRALESIONAL; INTRAMUSCULAR; SOFT TISSUE at 13:52

## 2019-05-03 RX ADMIN — LIDOCAINE HYDROCHLORIDE 3 ML: 10 INJECTION, SOLUTION EPIDURAL; INFILTRATION; INTRACAUDAL; PERINEURAL at 13:51

## 2019-05-03 RX ADMIN — IOPAMIDOL 3 ML: 408 INJECTION, SOLUTION INTRATHECAL at 13:52

## 2019-05-03 ASSESSMENT — MIFFLIN-ST. JEOR: SCORE: 1561.29

## 2019-05-03 NOTE — TELEPHONE ENCOUNTER
FUTURE VISIT INFORMATION      SURGERY INFORMATION:    Date: 5/8/19, 5/29/19    [facility]    Surgeon:  Lynnette Buchanan    Anesthesia Type:  Combined MAC with Topical    RECORDS REQUESTED FROM:       Primary Care Provider: Veronica Munson [facility]- requested records/ testing 5/3    Pertinent Medical History: Hypertension

## 2019-05-03 NOTE — IP AVS SNAPSHOT
MRN:[id number]                      After Visit Summary   5/3/2019    Stas Houston    MRN: [id number]           Visit Information        Department      5/3/2019 11:11 AM [address] [facility]          Review of your medicines      UNREVIEWED medicines. Ask your doctor about these medicines       Dose / Directions   amLODIPine 10 MG tablet  Commonly known as:  NORVASC      TAKE 1 TABLET BY MOUTH DAILY  Refills:  0     atorvastatin 20 MG tablet  Commonly known as:  LIPITOR      Dose:  20 mg  Take 20 mg by mouth  Refills:  0     DULoxetine 60 MG capsule  Commonly known as:  CYMBALTA      Dose:  60 mg  Take 60 mg by mouth  Refills:  0     gabapentin 300 MG capsule  Commonly known as:  NEURONTIN      Take 1 pill qhs for one week. If not too sleepy after one week, increase to BID.  Refills:  0     latanoprost 0.005 % ophthalmic solution  Commonly known as:  XALATAN  Used for:  Borderline glaucoma of right eye with ocular hypertension      Dose:  1 drop  Place 1 drop Into the left eye daily  Quantity:  1 Bottle  Refills:  3              Protect others around you: Learn how to safely use, store and throw away your medicines at www.disposemymeds.org.       Follow-ups after your visit       Your next 10 appointments already scheduled    May 07, 2019  2:30 PM CDT  (Arrive by 2:15 PM)  PAC EVALUATION with AICHA Rosas  [facility] [place] ([facility]) [address] [place] [address]  [phone]      May 07, 2019  3:30 PM CDT  (Arrive by 3:15 PM)  PAC RN ASSESSMENT with Sanju Pac Rn  [facility] [place] ([facility]) [address] [place] [address]  [phone]      May 07, 2019  4:00 PM CDT  (Arrive by 3:45 PM)  PAC Anesthesia Consult with Sanju Pac Anesthesiologist  [facility] [place] ([facility]) [facility]  [address] [address]  [phone]      May 08, 2019  Procedure with Lynnette Buchanan MD  [facility] ([facility] [place]) [address] [place] [phone]  [phone]   Located in the Clinics and Surgery Center at [address] [place] [address].   parking is very convenient and highly recommended.  is a flat rate fee.  Both  and self parkers should enter the main arrival plaza from [facility]; parking attendants will direct you based on your parking preference. Visit [email or web address] for more details.   May 09, 2019 10:15 AM CDT  Post-Op with Lynnette Buchanan MD  [facility] Clinic ([facility]) [address] [address]  [phone]      May 20, 2019  4:00 PM CDT  Return Walk In Ortho with Balaji Asencio MD  [facility] ([facility]) [address] [place] [address]  [phone]      May 21, 2019 10:15 AM CDT  Post-Op with Lynnette Buchanan MD  Eye Clinic ([facility]) [address] [address]  [phone]      May 29, 2019  Procedure with Lynnette Buchanan MD  [facility] ([facility]) [address] [place] [phone]  [phone]   Located in the Clinics and Surgery Center at [address] [place] [address].   parking is very convenient and highly recommended.  is a flat rate fee.  Both  and self parkers should enter the main arrival plaza from [facility]; parking attendants will direct you based on your parking preference. Visit [email or web address] for more details.   May 30, 2019  8:30 AM CDT  Post-Op with Lynnette  Kailee Buchanan MD  Eye Clinic ([facility]) [facility]  [address]  [address] [address]  [facility] [address]  [phone]      Jun 04, 2019 10:00 AM CDT  Post-Op with Lynnette Buchanan MD  [facility] Clinic ([facility]) [facility]  [address]  [address] [address]  [facility] [address]  [phone]         Care Instructions       Further instructions from your care team       [facility]    Discharge Instructions for Epidural Steroid Injection    Care of injection site:    If you have new numbness down your leg after the injection, this may last up to 4-6 hours, but should go away. You may need help with walking until your leg feels normal.    Over the next 24 to 48 hour, pain at the injection site may increase before it gets better.    For the next 48 hours, use ice packs for 15 minutes,3-4 times a day for pain relief.    If you have a bandage, you may remove it the next morning            Activity:    Limit you activity based on you pain level. Follow the doctor's orders for activity.    You may eat a normal diet.  Medicines:    Restart all your medicines today at your regular dose, including Coumadin (Warfarin).    If you are restarting Coumadin, talk to your doctor about having your INR checked.    Restart you platelet inhibitors and aspirin tomorrow.  For steroid shots: The steroid should help reduce swelling and pain. This may take from 3-14 days. Side effects from the shot will be mild and go away in 2-3 days.   Common side effects may include:    Insomnia    Heartburn    Flushed face    Water retention    Increased appetite    Increased blood sugar  If you have diabetes, watch you blood sugar closely, If needed, call your doctor to help control you blood sugar.    Some patient will get lasting relief from a single injection. Others may require up to 3  injections to get results. If you have more than one steroid injection,  "they should be given 2 weeks apart. Some patients do not receive relief of symptoms.    Call your doctor or go to the Emergency Room if you have severe pain, fever, or problems with your bowel or bladder control.    [facility] RADIOLOGY DEPARTMENT               Procedure Physician:  Dr. Fernandez                         Date:May 3, 2019               Telephone Numbers:  [phone]   After 4:30PM on weekdays, weekends and Holidays. Ask for the Radiologist on call. Someone is available 24 hours a day.          Additional Information About Your Visit       Care EveryWhere ID    This is your Care EveryWhere ID. This could be used by other organizations to access your [facility] medical records  [id number]       Your Vitals Were     Blood Pressure   143/83          Pulse   68          Temperature   97.6  F (36.4  C)          Respirations   16          Height   1.676 m (5' 5.98\")             Weight   79.4 kg (175 lb)    Pulse Oximetry   98%    BMI (Body Mass Index)   28.26 kg/m           Primary Care Provider Office Phone # Fax #    Veronica Munson [phone]      Equal Access to Services    [facility]USTAVO : Hadii aad ku hadasho Soomaali, waaxda luqadaha, qaybta kaalmada adeegyada, waxay idiin hayaan leilani kharacarmen kapoor . So [facility] [phone].    ATENCIÓN: Si habla español, tiene a galindo disposición servicios gratuitos de asistencia lingüística. Llame al [phone].    We comply with applicable federal civil rights laws and [place] laws. We do not discriminate on the basis of race, color, national origin, age, disability, sex, sexual orientation, or gender identity.           Thank you!    Thank you for choosing [facility] for your care. Our goal is always to provide you with excellent care. Hearing back from our patients is one way we can continue to improve our services. Please take a few minutes to complete the written survey that you may receive in the mail after you visit with us. Thank you!          "   Medication List      ASK your doctor about these medications          Morning Afternoon Evening Bedtime As Needed    amLODIPine 10 MG tablet  Also known as:  NORVASC  INSTRUCTIONS:  TAKE 1 TABLET BY MOUTH DAILY                     atorvastatin 20 MG tablet  Also known as:  LIPITOR  INSTRUCTIONS:  Take 20 mg by mouth                     DULoxetine 60 MG capsule  Also known as:  CYMBALTA  INSTRUCTIONS:  Take 60 mg by mouth                     gabapentin 300 MG capsule  Also known as:  NEURONTIN  INSTRUCTIONS:  Take 1 pill qhs for one week. If not too sleepy after one week, increase to BID.                     latanoprost 0.005 % ophthalmic solution  Also known as:  XALATAN  INSTRUCTIONS:  Place 1 drop Into the left eye daily

## 2019-05-03 NOTE — PROGRESS NOTES
Pt arrived back to unit 2A at 1420.  Alert and oriented.  VSS.  Pain is per baseline.  Pt eating.  Mid neck posterior dressing dry and intact.  Discharge instructions went over with patient.  Pt will discharge home at 1445.

## 2019-05-03 NOTE — DISCHARGE INSTRUCTIONS
[facility]    Discharge Instructions for Epidural Steroid Injection    Care of injection site:    If you have new numbness down your leg after the injection, this may last up to 4-6 hours, but should go away. You may need help with walking until your leg feels normal.    Over the next 24 to 48 hour, pain at the injection site may increase before it gets better.    For the next 48 hours, use ice packs for 15 minutes,3-4 times a day for pain relief.    If you have a bandage, you may remove it the next morning            Activity:    Limit you activity based on you pain level. Follow the doctor's orders for activity.    You may eat a normal diet.  Medicines:    Restart all your medicines today at your regular dose, including Coumadin (Warfarin).    If you are restarting Coumadin, talk to your doctor about having your INR checked.    Restart you platelet inhibitors and aspirin tomorrow.  For steroid shots: The steroid should help reduce swelling and pain. This may take from 3-14 days. Side effects from the shot will be mild and go away in 2-3 days.   Common side effects may include:    Insomnia    Heartburn    Flushed face    Water retention    Increased appetite    Increased blood sugar  If you have diabetes, watch you blood sugar closely, If needed, call your doctor to help control you blood sugar.    Some patient will get lasting relief from a single injection. Others may require up to 3  injections to get results. If you have more than one steroid injection, they should be given 2 weeks apart. Some patients do not receive relief of symptoms.    Call your doctor or go to the Emergency Room if you have severe pain, fever, or problems with your bowel or bladder control.    [facility] RADIOLOGY DEPARTMENT               Procedure Physician:  Dr. Fernandez                         Date:May 3, 2019               Telephone Numbers:  [phone]   After 4:30PM on weekdays, weekends and Holidays. Ask for the  Radiologist on call. Someone is available 24 hours a day.

## 2019-05-03 NOTE — IP AVS SNAPSHOT
[address] [address]                                    After Visit Summary   5/3/2019    Stas Houston    MRN: [id number]           After Visit Summary Signature Page    I have received my discharge instructions, and my questions have been answered. I have discussed any challenges I see with this plan with the nurse or doctor.    ..........................................................................................................................................  Patient/Patient Representative Signature      ..........................................................................................................................................  Patient Representative Print Name and Relationship to Patient    ..................................................               ................................................  Date                                   Time    ..........................................................................................................................................  Reviewed by Signature/Title    ...................................................              ..............................................  Date                                               Time          22EPIC Rev 08/18

## 2019-05-03 NOTE — PROGRESS NOTES
1210  Prep is complete for procedure.  Stas is here for Cervical RENU.  He has Left & Right shoulder  Pain.  Left being worse than right.  Right shoulder is more of a tightness as he describes it.  Labs have been sent.  Stas is having trouble scheduling a ride home.  CTRN

## 2019-05-07 ENCOUNTER — ANESTHESIA EVENT (OUTPATIENT)
Dept: SURGERY | Facility: AMBULATORY SURGERY CENTER | Age: 59
End: 2019-05-07

## 2019-05-07 ENCOUNTER — OFFICE VISIT (OUTPATIENT)
Dept: SURGERY | Facility: CLINIC | Age: 59
End: 2019-05-07
Payer: COMMERCIAL

## 2019-05-07 ENCOUNTER — APPOINTMENT (OUTPATIENT)
Dept: SURGERY | Facility: CLINIC | Age: 59
End: 2019-05-07
Payer: COMMERCIAL

## 2019-05-07 VITALS
BODY MASS INDEX: 27.59 KG/M2 | WEIGHT: 171.7 LBS | RESPIRATION RATE: 18 BRPM | DIASTOLIC BLOOD PRESSURE: 70 MMHG | TEMPERATURE: 98.2 F | HEIGHT: 66 IN | SYSTOLIC BLOOD PRESSURE: 104 MMHG | HEART RATE: 69 BPM | OXYGEN SATURATION: 97 %

## 2019-05-07 DIAGNOSIS — H25.812 COMBINED FORMS OF AGE-RELATED CATARACT OF LEFT EYE: Primary | ICD-10-CM

## 2019-05-07 DIAGNOSIS — H25.019 CORTICAL AGE-RELATED CATARACT, UNSPECIFIED LATERALITY: Primary | ICD-10-CM

## 2019-05-07 RX ORDER — KETOROLAC TROMETHAMINE 5 MG/ML
1 SOLUTION OPHTHALMIC 4 TIMES DAILY
Qty: 5 ML | Refills: 0 | Status: SHIPPED | OUTPATIENT
Start: 2019-05-07 | End: 2019-06-18

## 2019-05-07 RX ORDER — OFLOXACIN 3 MG/ML
1 SOLUTION/ DROPS OPHTHALMIC 4 TIMES DAILY
Qty: 1 BOTTLE | Refills: 0 | Status: SHIPPED | OUTPATIENT
Start: 2019-05-07 | End: 2019-06-18

## 2019-05-07 RX ORDER — PREDNISOLONE ACETATE 10 MG/ML
1 SUSPENSION/ DROPS OPHTHALMIC 4 TIMES DAILY
Qty: 5 ML | Refills: 0 | Status: SHIPPED | OUTPATIENT
Start: 2019-05-07 | End: 2020-06-25

## 2019-05-07 ASSESSMENT — LIFESTYLE VARIABLES: TOBACCO_USE: 1

## 2019-05-07 ASSESSMENT — MIFFLIN-ST. JEOR: SCORE: 1546.58

## 2019-05-07 NOTE — ANESTHESIA PREPROCEDURE EVALUATION
Anesthesia Pre-Procedure Evaluation    Patient: Stsa Houston   MRN:     [id number] Gender:   male   Age:    59 year old :      1960        Preoperative Diagnosis: Visually Significant Cataracts, Bilateral   Procedure(s):  Left Eye Phacoemulsification with Standard Intraocular Lens     Past Medical History:   Diagnosis Date     Depressive disorder      Glaucoma suspect      Hypertension      OHT (ocular hypertension)       Past Surgical History:   Procedure Laterality Date     ORTHOPEDIC SURGERY  2017    Left Shoulder Surgery          Anesthesia Evaluation     . Pt has had prior anesthetic. Type: MAC, Regional and General           ROS/MED HX    ENT/Pulmonary:     (+)AJ risk factors hypertension, tobacco use, Current use 1 PPD x 40 years  packs/day  , . .    Neurologic:     (+)migraines,     Cardiovascular:     (+) Dyslipidemia, hypertension----. : . . . :. . Previous cardiac testing date:results:date: results:ECG reviewed date: results: date: results:          METS/Exercise Tolerance:  >4 METS   Hematologic:  - neg hematologic  ROS       Musculoskeletal:   (+)  other musculoskeletal- knee pain      GI/Hepatic:     (+) GERD hepatitis type C, Other GI/Hepatic normal liver panel. negative Negative PCR after treatment at [facility]      Renal/Genitourinary:  - ROS Renal section negative       Endo:  - neg endo ROS       Psychiatric: Comment: Recovering alcoholism - last drink 11 months ago  Sees a therapist and has gabapentin for neuropathy, anxiety.     (+) psychiatric history anxiety      Infectious Disease:  - neg infectious disease ROS       Malignancy:      - no malignancy   Other:    (+) Possibly pregnant H/O Chronic Pain,no other significant disability                        PHYSICAL EXAM:   Mental Status/Neuro: A/A/O   Airway: Facies: Feasible  Mallampati: II  Mouth/Opening: Full  TM distance: > 6 cm  Neck ROM: Full   Respiratory: Auscultation: Wheezing; Other    (forced expiratory wheeze  "intermittently)  Resp. Rate: Normal     Resp. Effort: Normal      CV: Rhythm: Regular  Rate: Age appropriate  Heart: Normal Sounds   Comments:      Dental:  Dental Comments: Edentulous lower, upper denture                Lab Results   Component Value Date    WBC 9.0 11/02/2016    HGB 13.5 11/02/2016    HCT 39.0 (L) 11/02/2016     05/03/2019     11/02/2016    POTASSIUM 3.2 (L) 11/02/2016    CHLORIDE 109 11/02/2016    CO2 27 11/02/2016    BUN 14 11/02/2016    CR 0.97 11/02/2016     (H) 11/02/2016    AMBROSIO 8.5 11/02/2016    ALBUMIN 4.3 11/02/2016    PROTTOTAL 7.6 11/02/2016    ALT 24 11/02/2016    AST 25 11/02/2016    ALKPHOS 93 11/02/2016    BILITOTAL 0.2 11/02/2016    PTT 29 05/03/2019    INR 1.00 05/03/2019       Preop Vitals  BP Readings from Last 3 Encounters:   05/03/19 143/83   04/02/19 126/76   11/03/16 (!) 153/108    Pulse Readings from Last 3 Encounters:   05/03/19 68      Resp Readings from Last 3 Encounters:   05/03/19 16   04/12/19 16   11/03/16 16    SpO2 Readings from Last 3 Encounters:   05/03/19 98%   11/03/16 95%      Temp Readings from Last 1 Encounters:   05/03/19 97.6  F (36.4  C)    Ht Readings from Last 1 Encounters:   05/03/19 1.676 m (5' 5.98\")      Wt Readings from Last 1 Encounters:   05/03/19 79.4 kg (175 lb)    Estimated body mass index is 28.26 kg/m  as calculated from the following:    Height as of 5/3/19: 1.676 m (5' 5.98\").    Weight as of 5/3/19: 79.4 kg (175 lb).     LDA:            Assessment:   ASA SCORE: 3    NPO Status: > 6 hours since completed Solid Foods   Documentation: H&P complete; Preop Testing complete; Consents complete   Proceeding: Proceed without further delay  Tobacco Use:  NO Active use of Tobacco/UNKNOWN Tobacco use status     Plan:   Anes. Type:  MAC   Pre-Induction: Midazolam IV; Acetaminophen PO   Induction:  IV (Standard)   Airway: Native Airway   Access/Monitoring: PIV   Maintenance: Propofol; IV   Emergence: Procedure Site   Logistics: Same " Day Surgery     Postop Pain/Sedation Strategy:  Standard-Options: Opioids PRN     PONV Management:  Adult Risk Factors:, Non-Smoker, Postop Opioids  Prevention: Propofol Infusion; Ondansetron                  PAC Discussion and Assessment    ASA Classification: 2  Case is suitable for:   Anesthetic techniques and relevant risks discussed: MAC with GA as backup  Invasive monitoring and risk discussed: No  Types:   Possibility and Risk of blood transfusion discussed: No  NPO instructions given:   Additional anesthetic preparation and risks discussed:   Needs early admission to pre-op area:   Other:     PAC Resident/NP Anesthesia Assessment:  Stas Houston is a 59 year old male for left eye phacoemulsification, with Dr. Danitza Buchanan, on 5/8/19, under MAC, in treatment of cataract. PAC referral for risk assessment and optimization for anesthesia.   Pre-operative considerations include:  1.) CV: Cardiac risks of HTN (Norvasc), HLD (Lipitor) and current daily smoker. 2013 EKG NSR. No CP, palpitations, REID, leg edema. No known CAD. Exercise tolerance is > 4 METS.  RCRI = 0 reflecting 0.4% risk of major adverse cardiac event. No further cardiac evaluation indicated  2.) Pulmonary: Current daily tobacco smoker. No current pulmonary dx, sx or meds.   Educated on risks of smoking and benefits of cessation  AJ risk is 2/8 = low risk  3.) Chronic cervical and left shoulder pain/arm/hand pain managed with neurontin. Anxiety. Alcohol dependence in remission and active in counseling. (per [facility] records)   Last drink was         .    4.) GI: Hepatitis C treated at [facility] with resolution.  PCR negative with log < 1.18. Liver panel normal 3/21/19  PONV score 0  5.) Heme: Recent 3/21/19 HGB 14.   VTE risk is low  6.) Anesthesia: Full dentures. Wearing upper today. Last procedure - endoscopy-no problems  Pt discussed with Dr. Romero.    Reviewed and Signed by PAC Mid-Level Provider/Resident  Mid-Level Provider/Resident: Leticia  VENANCIO Puri  Date: 5/7/19  Time: 3:40 PM    Attending Anesthesiologist Anesthesia Assessment:        Anesthesiologist:   Date:   Time:   Pass/Fail:   Disposition:     PAC Pharmacist Assessment:        Pharmacist:   Date:   Time:        AICHA Rosas

## 2019-05-07 NOTE — PATIENT INSTRUCTIONS
Preparing for Your Surgery      Name:  Stas Housotn   MRN:  [id number]   :  1960   Today's Date:  2019     Arriving for surgery:  Surgery date:  19  Arrival time:  5:45 am    Please come to:     [facility]  [address] [place] [id number]     Parking is available in front of the Clinics and Surgery Center building from 5:30AM to 8:00PM.  -  Proceed to the 5th floor to check into the Ambulatory Surgery Center.              >> There will be patient concierges on the 1st and 5th floor, for assistance or an escort, if you would like.              >> Please call [phone] with any questions day of surgery.    -  Bring your ID and insurance card.    What can I eat or drink?  -  You may have solid food or milk products until 8 hours prior to your surgery. (Until 11:15 pm 19 )  -  You may have water, apple juice or 7up/Sprite until 2 hours prior to your surgery. (Until 5:15 am )    Which medicines can I take?       -  Do not bring your own medications to the hospital, except for eye drops.        -  Follow  Clinic instructions regarding Ibuprofen. If no instructions given, NO Ibuprofen the day prior to surgery.     -  Please take these medications the morning of surgery:  Amlodipine, Gabapentin      Acetaminophen (Tylenol) if needed    How do I prepare myself?  -  Take two showers: one the night before surgery; and one the morning of surgery.         Use Scrubcare or Hibiclens to wash from neck down.  You may use your own shampoo and conditioner. No other hair products.   -  Do NOT use lotion, powder, colognes, deodorant, or antiperspirant the day of your surgery.  -  Do NOT wear any jewelry.    Questions or Concerns:  If your surgery is at the Ambulatory Surgery Center, please call [phone]. (Mon - Fri 8 am- 3:30 pm)  Questions about surgery, contact your Surgeons office.      AFTER YOUR SURGERY  Breathing exercises   Breathing exercises help you  recover faster. Take deep breaths and let the air out slowly. This will:     Help you wake up after surgery.    Help prevent complications like pneumonia.  Preventing complications will help you go home sooner.   Nausea and vomiting   You may feel sick to your stomach after surgery; if so, let your nurse know.    Pain control:  After surgery, you may have pain. Our goal is to help you manage your pain. Pain medicine will help you feel comfortable enough to do activities that will help you heal.  These activities may include breathing exercises, walking and physical therapy.   To help your health care team treat your pain we will ask: 1) If you have pain  2) where it is located 3) describe your pain in your words  Methods of pain control include medications given by mouth, vein or by nerve block for some surgeries.  Sequential Compression Device (SCD) or Pneumo Boots:  You may need to wear SCD S on your legs or feet. These are wraps connected to a machine that pumps in air and releases it. The repeated pumping helps prevent blood clots from forming.

## 2019-05-07 NOTE — H&P
Pre-Operative H & P     CC:  Preoperative exam to assess for increased cardiopulmonary risk while undergoing surgery and anesthesia.    Date of Encounter: 5/7/2019  Primary Care Physician:  Veronica Munson  Stas Houston is a 59 year old male who presents for pre-operative H & P in preparation for  left eye phacoemulsification, with Dr. Danitza Buchanan, on 5/8/19, at [facility],    under MAC, in treatment of cataract. He notices increased difficulty with vision.  No headache.   History is obtained from the patient.     Past Medical History  Past Medical History:   Diagnosis Date    HLD      Anxiety      Glaucoma suspect      Hypertension     Hepatitis C treated and resolved      OHT (ocular hypertension)        Past Surgical History  Past Surgical History:   Procedure Laterality Date    I and D abdominal abscess  2012    Direct laryngoscopy  5/2012     ORTHOPEDIC SURGERY  2017    Left Shoulder Surgery       Hx of Blood transfusions/reactions: no     Hx of abnormal bleeding or anti-platelet use: no    Menstrual history: No LMP for male patient.    Steroid use in the last year: no    Personal or FH with difficulty with Anesthesia:  no    Prior to Admission Medications  Current Outpatient Medications   Medication Sig Dispense Refill     amLODIPine (NORVASC) 10 MG tablet TAKE 1 TABLET BY MOUTH DAILY IN THE MORNING       atorvastatin (LIPITOR) 20 MG tablet Take 20 mg by mouth every evening        gabapentin (NEURONTIN) 300 MG capsule 300 MG TABLET TWICE DAILY       latanoprost (XALATAN) 0.005 % ophthalmic solution Place 1 drop Into the left eye daily 1 Bottle 3       Allergies  No Known Allergies    Social History  Social History     Socioeconomic History     Marital status: Single     Number of children: None   Occupational History     Not on file   Tobacco Use     Smoking status: Current Every Day Smoker     Smokeless tobacco: Never Used   Substance and Sexual Activity     Alcohol use:  "Last drink 11 months ago     Drug use: Not on file     Sexual activity: Not on file     Family History   Problem Relation Age of Onset     Glaucoma Mother      Diabetes No family hx of      Macular Degeneration No family hx of    Father  : alcoholism, brain D/O  Mother emphysema, aneurysm   2 brothers: one with cancer, schizophrenia  1 sister: Alive and well        Anesthesia Evaluation  Pt has had prior anesthetic. Type: MAC, Regional and General     ROS/MED HX    ENT/Pulmonary:     (+)AJ risk factors hypertension   tobacco use, Current use 1 PPD x 40 years     Neurologic:     (+)migraines,     Cardiovascular:     (+) Dyslipidemia, hypertension  Previous cardiac testing ECG reviewed date:2013- NSR     METS/Exercise Tolerance:  >4 METS   Hematologic:  - neg hematologic  ROS       Musculoskeletal:   Chronic cervical and left shoulder pain/arm/hand pain managed with neurontin.     GI/Hepatic:     (+) GERD hepatitis type C, Other GI/Hepatic normal liver panel. negative Negative PCR after treatment at [facility]      Renal/Genitourinary:  - ROS Renal section negative       Endo:  - neg endo ROS       Psychiatric: Comment: Recovering alcoholism - last drink 11 months ago  Sees a therapist and has gabapentin for neuropathy, anxiety.     (+) psychiatric history anxiety       Infectious Disease:  - Hx Hep C treated and recovered  neg infectious disease ROS       Malignancy:      - no malignancy   Other:    (+) Possibly pregnant H/O Chronic Pain,no other significant disability          The complete review of systems is negative other than noted in the HPI or here.   Temp: 98.2  F (36.8  C) Temp src: Oral BP: 104/70 Pulse: 69   Resp: 18 SpO2: 97 %         171 lbs 11.2 oz  5' 6\"   Body mass index is 27.71 kg/m .       Physical Exam  Constitutional: Awake, alert, cooperative, no apparent distress, and appears stated age.  Eyes: Pupils equal, round and reactive to light, extra ocular muscles intact, sclera clear, " conjunctiva normal.  HENT: Normocephalic, oral pharynx with moist mucus membranes, edentulous lower, upper dentures. No goiter appreciated.   Respiratory: Clear to auscultation bilaterally, no crackles. Did have a forced expiratory wheeze with a cough during exam. Otherwise clear.  Cardiovascular: Regular rate and rhythm, normal S1 and S2, and no murmur noted.  Carotids +2, no bruits. No LE edema. Palpable pulses to radial arteries.   GI: Normal bowel sounds, soft, non-distended, non-tender, no masses palpated, no hepatosplenomegaly.  Surgical scars: vertical well healed central scar  Lymph/Hematologic: No cervical lymphadenopathy and no supraclavicular lymphadenopathy.  Genitourinary:  deferred  Skin: Warm and dry.  No rashes at anticipated surgical site.   Musculoskeletal: Full ROM of neck. There is no redness, warmth, or swelling of the joints. Gross motor strength BUE is normal.    Neurologic: Awake, alert, oriented to name, place and time. Cranial nerves II-XII are grossly intact. Gait is normal.   Neuropsychiatric: Calm, cooperative. Normal affect.     Labs: (personally reviewed)  [facility] [place]   [place]21/19   BMP: NA= 143, K+ 4.6, creatinine 1.03, glucose 134  CBC: HGB 14, platelets 250,000. Liver panel normal  EK EKG NSR.     ASSESSMENT and PLAN  Stas Houston is a 59 year old male scheduled to undergo left eye phacoemulsification, with Dr. Danitza Buchanan, on 19, under MAC, in treatment of cataract.     Pre-operative considerations include:  1.) CV: Cardiac risks of HTN (Norvasc), HLD (Lipitor) and current daily smoker.  No CP, palpitations, REID, leg edema. No known CAD. Exercise tolerance is > 4 METS.  RCRI = 0 reflecting 0.4% risk of major adverse cardiac event. No further cardiac evaluation indicated    2.) Pulmonary: Current daily tobacco smoker. No current pulmonary dx, sx or meds. Expiratory wheeze with cough on exam - moving air well and clear with deep breaths  Educated on risks of smoking and  benefits of cessation. Pt interested in quitting and encouraged  AJ risk is 2/8 = low risk     3.) GI: Hepatitis C treated at [facility] with resolution.  PCR negative with log < 1.18. Liver panel normal 3/21/19  PONV score 0    5.) Heme: Recent 3/21/19 HGB 14.   VTE risk is low  6.) Anesthesia: Full dentures. Wearing upper today. Last procedure - endoscopy-no problems      Patient was discussed with Dr Romero. No labs for this low risk procedure.    AICHA Rosas  Preoperative Assessment Center  [facility]  Clinic and Surgery Center  Phone: [phone]  Fax: [phone]

## 2019-05-08 ENCOUNTER — HOSPITAL ENCOUNTER (OUTPATIENT)
Facility: AMBULATORY SURGERY CENTER | Age: 59
End: 2019-05-08
Attending: OPHTHALMOLOGY
Payer: COMMERCIAL

## 2019-05-08 ENCOUNTER — ANESTHESIA (OUTPATIENT)
Dept: SURGERY | Facility: AMBULATORY SURGERY CENTER | Age: 59
End: 2019-05-08

## 2019-05-08 VITALS
HEART RATE: 62 BPM | RESPIRATION RATE: 16 BRPM | BODY MASS INDEX: 27 KG/M2 | SYSTOLIC BLOOD PRESSURE: 140 MMHG | DIASTOLIC BLOOD PRESSURE: 94 MMHG | HEIGHT: 66 IN | OXYGEN SATURATION: 96 % | TEMPERATURE: 97.4 F | WEIGHT: 168 LBS

## 2019-05-08 DIAGNOSIS — H25.812 COMBINED FORMS OF AGE-RELATED CATARACT OF LEFT EYE: Primary | ICD-10-CM

## 2019-05-08 DIAGNOSIS — H25.812 COMBINED FORMS OF AGE-RELATED CATARACT, LEFT EYE: Primary | ICD-10-CM

## 2019-05-08 DEVICE — EYE IMP IOL AMO PCL TECNIS ZCB00 11.0: Type: IMPLANTABLE DEVICE | Site: EYE | Status: FUNCTIONAL

## 2019-05-08 RX ORDER — FENTANYL CITRATE 50 UG/ML
25-50 INJECTION, SOLUTION INTRAMUSCULAR; INTRAVENOUS
Status: DISCONTINUED | OUTPATIENT
Start: 2019-05-08 | End: 2019-05-09 | Stop reason: HOSPADM

## 2019-05-08 RX ORDER — BALANCED SALT SOLUTION 6.4; .75; .48; .3; 3.9; 1.7 MG/ML; MG/ML; MG/ML; MG/ML; MG/ML; MG/ML
SOLUTION OPHTHALMIC PRN
Status: DISCONTINUED | OUTPATIENT
Start: 2019-05-08 | End: 2019-05-08 | Stop reason: HOSPADM

## 2019-05-08 RX ORDER — TROPICAMIDE 10 MG/ML
1 SOLUTION/ DROPS OPHTHALMIC
Status: COMPLETED | OUTPATIENT
Start: 2019-05-08 | End: 2019-05-08

## 2019-05-08 RX ORDER — ONDANSETRON 4 MG/1
4 TABLET, ORALLY DISINTEGRATING ORAL EVERY 30 MIN PRN
Status: DISCONTINUED | OUTPATIENT
Start: 2019-05-08 | End: 2019-05-09 | Stop reason: HOSPADM

## 2019-05-08 RX ORDER — CYCLOPENTOLATE HYDROCHLORIDE 10 MG/ML
1 SOLUTION/ DROPS OPHTHALMIC
Status: COMPLETED | OUTPATIENT
Start: 2019-05-08 | End: 2019-05-08

## 2019-05-08 RX ORDER — FENTANYL CITRATE 50 UG/ML
INJECTION, SOLUTION INTRAMUSCULAR; INTRAVENOUS PRN
Status: DISCONTINUED | OUTPATIENT
Start: 2019-05-08 | End: 2019-05-08

## 2019-05-08 RX ORDER — OXYCODONE HYDROCHLORIDE 5 MG/1
5 TABLET ORAL EVERY 4 HOURS PRN
Status: DISCONTINUED | OUTPATIENT
Start: 2019-05-08 | End: 2019-05-09 | Stop reason: HOSPADM

## 2019-05-08 RX ORDER — SODIUM CHLORIDE, SODIUM LACTATE, POTASSIUM CHLORIDE, CALCIUM CHLORIDE 600; 310; 30; 20 MG/100ML; MG/100ML; MG/100ML; MG/100ML
INJECTION, SOLUTION INTRAVENOUS CONTINUOUS
Status: DISCONTINUED | OUTPATIENT
Start: 2019-05-08 | End: 2019-05-09 | Stop reason: HOSPADM

## 2019-05-08 RX ORDER — LIDOCAINE 40 MG/G
CREAM TOPICAL
Status: DISCONTINUED | OUTPATIENT
Start: 2019-05-08 | End: 2019-05-09 | Stop reason: HOSPADM

## 2019-05-08 RX ORDER — PHENYLEPHRINE HYDROCHLORIDE 25 MG/ML
1 SOLUTION/ DROPS OPHTHALMIC
Status: COMPLETED | OUTPATIENT
Start: 2019-05-08 | End: 2019-05-08

## 2019-05-08 RX ORDER — SODIUM CHLORIDE, SODIUM LACTATE, POTASSIUM CHLORIDE, CALCIUM CHLORIDE 600; 310; 30; 20 MG/100ML; MG/100ML; MG/100ML; MG/100ML
500 INJECTION, SOLUTION INTRAVENOUS CONTINUOUS
Status: DISCONTINUED | OUTPATIENT
Start: 2019-05-08 | End: 2019-05-09 | Stop reason: HOSPADM

## 2019-05-08 RX ORDER — PROPARACAINE HYDROCHLORIDE 5 MG/ML
1 SOLUTION/ DROPS OPHTHALMIC ONCE
Status: COMPLETED | OUTPATIENT
Start: 2019-05-08 | End: 2019-05-08

## 2019-05-08 RX ORDER — MOXIFLOXACIN IN NACL,ISO-OS/PF 0.3MG/0.3
SYRINGE (ML) INTRAOCULAR PRN
Status: DISCONTINUED | OUTPATIENT
Start: 2019-05-08 | End: 2019-05-08 | Stop reason: HOSPADM

## 2019-05-08 RX ORDER — MEPERIDINE HYDROCHLORIDE 25 MG/ML
12.5 INJECTION INTRAMUSCULAR; INTRAVENOUS; SUBCUTANEOUS
Status: DISCONTINUED | OUTPATIENT
Start: 2019-05-08 | End: 2019-05-09 | Stop reason: HOSPADM

## 2019-05-08 RX ORDER — TETRACAINE HYDROCHLORIDE 5 MG/ML
SOLUTION OPHTHALMIC PRN
Status: DISCONTINUED | OUTPATIENT
Start: 2019-05-08 | End: 2019-05-08 | Stop reason: HOSPADM

## 2019-05-08 RX ORDER — ONDANSETRON 2 MG/ML
4 INJECTION INTRAMUSCULAR; INTRAVENOUS EVERY 30 MIN PRN
Status: DISCONTINUED | OUTPATIENT
Start: 2019-05-08 | End: 2019-05-09 | Stop reason: HOSPADM

## 2019-05-08 RX ORDER — OFLOXACIN 3 MG/ML
1 SOLUTION/ DROPS OPHTHALMIC 4 TIMES DAILY
Qty: 1 BOTTLE | Refills: 0 | Status: SHIPPED | OUTPATIENT
Start: 2019-05-08 | End: 2019-06-18

## 2019-05-08 RX ORDER — NALOXONE HYDROCHLORIDE 0.4 MG/ML
.1-.4 INJECTION, SOLUTION INTRAMUSCULAR; INTRAVENOUS; SUBCUTANEOUS
Status: DISCONTINUED | OUTPATIENT
Start: 2019-05-08 | End: 2019-05-09 | Stop reason: HOSPADM

## 2019-05-08 RX ORDER — PREDNISOLONE ACETATE 10 MG/ML
1 SUSPENSION/ DROPS OPHTHALMIC 4 TIMES DAILY
Qty: 1 BOTTLE | Refills: 0 | Status: SHIPPED | OUTPATIENT
Start: 2019-05-08 | End: 2020-06-25

## 2019-05-08 RX ORDER — ACETAMINOPHEN 325 MG/1
975 TABLET ORAL ONCE
Status: COMPLETED | OUTPATIENT
Start: 2019-05-08 | End: 2019-05-08

## 2019-05-08 RX ORDER — LIDOCAINE HYDROCHLORIDE 10 MG/ML
INJECTION, SOLUTION EPIDURAL; INFILTRATION; INTRACAUDAL; PERINEURAL PRN
Status: DISCONTINUED | OUTPATIENT
Start: 2019-05-08 | End: 2019-05-08 | Stop reason: HOSPADM

## 2019-05-08 RX ORDER — KETOROLAC TROMETHAMINE 5 MG/ML
1 SOLUTION OPHTHALMIC 4 TIMES DAILY
Qty: 1 BOTTLE | Refills: 0 | Status: SHIPPED | OUTPATIENT
Start: 2019-05-08 | End: 2020-06-25

## 2019-05-08 RX ADMIN — CYCLOPENTOLATE HYDROCHLORIDE 1 DROP: 10 SOLUTION/ DROPS OPHTHALMIC at 06:40

## 2019-05-08 RX ADMIN — CYCLOPENTOLATE HYDROCHLORIDE 1 DROP: 10 SOLUTION/ DROPS OPHTHALMIC at 06:34

## 2019-05-08 RX ADMIN — SODIUM CHLORIDE, SODIUM LACTATE, POTASSIUM CHLORIDE, CALCIUM CHLORIDE 500 ML: 600; 310; 30; 20 INJECTION, SOLUTION INTRAVENOUS at 06:35

## 2019-05-08 RX ADMIN — TROPICAMIDE 1 DROP: 10 SOLUTION/ DROPS OPHTHALMIC at 06:34

## 2019-05-08 RX ADMIN — TROPICAMIDE 1 DROP: 10 SOLUTION/ DROPS OPHTHALMIC at 06:40

## 2019-05-08 RX ADMIN — PROPARACAINE HYDROCHLORIDE 1 DROP: 5 SOLUTION/ DROPS OPHTHALMIC at 06:22

## 2019-05-08 RX ADMIN — ACETAMINOPHEN 975 MG: 325 TABLET ORAL at 06:36

## 2019-05-08 RX ADMIN — PHENYLEPHRINE HYDROCHLORIDE 1 DROP: 25 SOLUTION/ DROPS OPHTHALMIC at 06:23

## 2019-05-08 RX ADMIN — PHENYLEPHRINE HYDROCHLORIDE 1 DROP: 25 SOLUTION/ DROPS OPHTHALMIC at 06:40

## 2019-05-08 RX ADMIN — CYCLOPENTOLATE HYDROCHLORIDE 1 DROP: 10 SOLUTION/ DROPS OPHTHALMIC at 06:23

## 2019-05-08 RX ADMIN — FENTANYL CITRATE 50 MCG: 50 INJECTION, SOLUTION INTRAMUSCULAR; INTRAVENOUS at 07:09

## 2019-05-08 RX ADMIN — PHENYLEPHRINE HYDROCHLORIDE 1 DROP: 25 SOLUTION/ DROPS OPHTHALMIC at 06:35

## 2019-05-08 RX ADMIN — TROPICAMIDE 1 DROP: 10 SOLUTION/ DROPS OPHTHALMIC at 06:23

## 2019-05-08 ASSESSMENT — MIFFLIN-ST. JEOR: SCORE: 1529.79

## 2019-05-08 NOTE — ANESTHESIA CARE TRANSFER NOTE
Patient: Stas Houston    Procedure(s):  Left Eye Phacoemulsification with Standard Intraocular Lens    Diagnosis: Visually Significant Cataracts, Bilateral  Diagnosis Additional Information: No value filed.    Anesthesia Type:   No value filed.     Note:  Airway :Room Air  Patient transferred to:Phase II  Handoff Report: Identifed the Patient, Identified the Reponsible Provider, Reviewed the pertinent medical history, Discussed the surgical course, Reviewed Intra-OP anesthesia mangement and issues during anesthesia, Set expectations for post-procedure period and Allowed opportunity for questions and acknowledgement of understanding      Vitals: (Last set prior to Anesthesia Care Transfer)    CRNA VITALS  5/8/2019 0713 - 5/8/2019 0748      5/8/2019             Pulse:  56    Ht Rate:  0  (Abnormal)     SpO2:  99 %    Resp Rate (set):  10                Electronically Signed By: PAULO Oh CRNA  May 8, 2019  7:48 AM

## 2019-05-08 NOTE — DISCHARGE INSTRUCTIONS
Discharge Instructions for Cataract Surgery  Eye Drop Instructions:    Start your eye drops today for the left eye.  1. Prednisolone (pink/white top) - 1 drop 4 times per day  2. Ofloxacin (tan top) - 1 drop 4 times per day  3. Ketorolac (gray top) - 1 drop 4 times per day    No eye rubbing.  No water in the eye.   Wear the shield over the eye at bedtime and sunglasses when outdoors.  Do not lift over 10-15 lbs.  Space out eye drops by at least 3-5 minutes apart.     If you have any worsening eye pain, redness, flashes/floaters, or decreased vision, please call the Eye Clinic at [phone].      A surgeon removed the cloudy lens in your eye and replaced it with a clear man-made lens. Be sure to have an adult family member or friend drive you home after surgery. Here s what you can expect following surgery and tips for a healthy recovery.  What to expect  It is normal to have the following:    Bruised or bloodshot eye for 7 days    Itching and mild discomfort for several days    Some fluid discharge    Sensitivity to light    Scratchy, sandlike feeling in the eye for 2 weeks    Feeling tired, especially during the first 24 hours  Activity level    Do not drive for 2 days or as instructed by your eye care provider.    Do not drink alcohol for at least 24 hours.    Avoid bending at the waist to  objects or lifting anything heavy for 2 days.    Relax for the first 24 hours after surgery. Watching TV and reading are OK and won t harm your eye.  Eye protection    Do not rub or press on your eye.    Sleep on your back or on your unoperated side for 2 nights.    If instructed, wear a bandage over your eye for 2 days and 2 nights.    If instructed, wear a shield to protect your eye for 2 days and 2 nights.  Using eye drops  You may be given special eye drops or ointment. Here is one way to use eye drops:    Tilt your head back.    Pull your bottom eyelid down.    Squeeze one drop into your eye. Do not touch your  eye with the bottle tip.    Close your eyes for a few seconds.    If you need more than one drop, wait at least 5 minutes before adding the next one.   Call your eye care provider right away if you have any of the following:    Bleeding or discharge from the eye    Sudden worsening of your vision.    Pain that does not improve with the pain medicine that is recommended for you.    Nausea or vomiting    Chills or fever over 100.4 F (39.1 C)   Date Last Reviewed: 10/1/2017    3262-9485 [employer]. [address] [place] [address]. All rights reserved. This information is not intended as a substitute for professional medical care. Always follow your healthcare professional's instructions.      [facility]  Home Care Following Anesthesia  For 24 hours after surgery:  1. Get plenty of rest.  A responsible adult must stay with you for at least 24 hours after you leave the surgery center.  2. Do not drive or use heavy equipment.  If you have weakness or tingling, don't drive or use heavy equipment until this feeling goes away.   3. Do not drink alcohol.   4. Avoid strenuous or risky activities.  Ask for help when climbing stairs.  5. You may feel lightheaded.  IF so, sit for a few minutes before standing.  Have someone help you get up.   6. If you have nausea (feel sick to your stomach): Drink only clear liquids such as apple juice, ginger ale, broth or 7-Up.  Rest may also help.  Be sure to drink enough fluids.  Move to a regular diet as you feel able.   7. You may have a slight fever.  Call the doctor if your fever is over 100 F (37.7 C) (taken under the tongue) or lasts longer than 24 hours.  8. You may have a dry mouth, a sore throat, muscle aches or trouble sleeping. These should go away after 24 hours.  9. Do not make important or legal decisions.        Today you received a Marcaine or bupivacaine block to numb the nerves near your surgery site.  This is a  "block using local anesthetic or \"numbing\" medication injected around the nerves to anesthetize or \"numb\" the area supplied by those nerves.  This block is injected into the muscle layer near your surgical site.  The medication may numb the location where you had surgery for 6-18 hours, but may last up to 24 hours.  If your surgical site is an arm or leg you should be careful with your affected limb, since it is possible to injure your limb without being aware of it due to the numbing.  Until full feeling returns, you should guard against bumping or hitting your limb, and avoid extreme hot or cold temperatures on the skin.  As the block wears off, the feeling will return as a tingling or prickly sensation near your surgical site.  You will experience more discomfort from your incision as the feeling returns.  You may want to take a pain pill (a narcotic or Tylenol if this was prescribed by your surgeon) when you start to experience mild pain before the pain beccomes more severe.  If your pain medications do not control your pain you should notifiy your surgeon.    Tips for taking pain medications  To get the best pain relief possible, remember these points:    Take pain medications as directed, before pain becomes severe.    Pain medication can upset your stomach: taking it with food may help.    Constipation is a common side effect of pain medication. Drink plenty of  fluids.    Eat foods high in fiber. Take a stool softener if recommended by your doctor or pharmacist.    Do not drink alcohol, drive or operate machinery while taking pain medications.    Ask about other ways to control pain, such as with heat, ice or relaxation.    Tylenol/Acetaminophen Consumption  To help encourage the safe use of acetaminophen, the makers of TYLENOL  have lowered the maximum daily dose for single-ingredient Extra Strength TYLENOL  (acetaminophen) products sold in the [place] from 8 pills per day (4,000 mg) to 6 pills per day (3,000 " mg). The dosing interval has also changed from 2 pills every 4-6 hours to 2 pills every 6 hours.    If you feel your pain relief is insufficient, you may take Tylenol/Acetaminophen in addition to your narcotic pain medication.     Be careful not to exceed 3,000 mg of Tylenol/Acetaminophen in a 24 hour period from all sources.    If you are taking extra strength Tylenol/acetaminophen (500 mg), the maximum dose is 6 tablets in 24 hours.    If you are taking regular strength acetaminophen (325 mg), the maximum dose is 9 tablets in 24 hours.    Call a doctor for any of the followin. Signs of infection (fever, growing tenderness at the surgery site, a large amount of drainage or bleeding, severe pain, foul-smelling drainage, redness, swelling).  2. It has been over 8 to 10 hours since surgery and you are still not able to urinate (pass water).  3. Headache for over 24 hours.  4. Numbness, tingling or weakness the day after surgery (if you had spinal anesthesia).  Your doctor is:       Dr. Buchanan, Opthalmology: [phone]               Or dial [phone] and ask for the resident on call for:  Ophthalmology  For emergency care, call the:  [facility]:  [phone] (TTY for hearing impaired: [phone])

## 2019-05-08 NOTE — OP NOTE
Operative Report    DATE OF OPERATION: 5/8/2019    PRE-OPERATIVE DIAGNOSIS: Visually significant cataract, left eye    POST-OPERATIVE DIAGNOSIS: Same    PROCEDURE: Phacoemulsification of cataract and insertion of intraocular lens, left eye    ATTENDING: Lynnette Buchanan MD    FINDINGS: None    COMPLICATIONS: None    BLOOD LOSS: <1cc    IMPLANTS: ZCB00 11.0 D    INDICATION FOR PROCEDURE   The patient has a visually-significant cataract that has been affecting their activities of daily living. The risks, including, but not limited to infection, loss of vision, loss of eye, need for more surgery, and bleeding, along with the benefits, alternatives, expectations, and the procedure itself were discussed at length with the patient who wished to proceed with surgery.     DESCRIPTION OF PROCEDURE   In the preoperative suite, the patient was identified, the surgical site marked and informed consent was obtained. The patient was the brought back to the operative suite where the appropriate anesthesia monitors were connected. The patient's eye was prepped and draped in the usual sterile fashion for ophthalmic surgery.   A 0.12 forceps and a supersharp blade were used to make a paracentesis incision. Aqueous was replaced with 1% preservative-free lidocaine, and then viscoat.  A 2.6 mm keratome was used to make a temporal, triplanar clear corneal incision. Capsulorrhexis was completed with a bent cystitome and Utrata forceps. Hydrodissection of the nucleus was achieved with a cannula. The nucleus was found to move freely within the capsular bag. The cataract was removed using the horizontal chop technique.   The irrigation and aspiration handpiece was used to remove the cortex. The capsular bag was filled with Provisc. The intraocular lens was injected into the capsular bag. The remaining viscoelastic was removed using irrigation and aspiration. The lens was found to be well-centered and in the capsular bag. Intracameral  moxifloxacin was then injected into the anterior chamber. BSS on a cannula was used to hydrate the clear corneal and paracentesis incisions. Weck-dylan sponges were used to confirm there were no leaks from the incisions.   Topical moxifloxacin and maxitrol ointment were applied on the eye. A shield was taped over the eye. The patient was then taken to the recovery room in stable condition having tolerated the procedure well and discharged home in good condition.   Dr. Lynnette Buchanan was scrubbed and performed the entire case.

## 2019-05-08 NOTE — ANESTHESIA POSTPROCEDURE EVALUATION
Anesthesia POST Procedure Evaluation    Patient: Stas Houston   MRN:     [id number] Gender:   male   Age:    59 year old :      1960        Preoperative Diagnosis: Visually Significant Cataracts, Bilateral   Procedure(s):  Left Eye Phacoemulsification with Standard Intraocular Lens   Postop Comments: No value filed.       Anesthesia Type:  MAC  No value filed.    Reportable Event: NO     PAIN: Uncomplicated   Sign Out status: Comfortable, Well controlled pain     PONV: No PONV   Sign Out status:  No Nausea or Vomiting     Neuro/Psych: Uneventful perioperative course   Sign Out Status: Preoperative baseline; Age appropriate mentation     Airway/Resp.: Uneventful perioperative course   Sign Out Status: Non labored breathing, age appropriate RR; Resp. Status within EXPECTED Parameters     CV: Uneventful perioperative course   Sign Out status: Appropriate BP and perfusion indices; Appropriate HR/Rhythm     Disposition:   Sign Out in:  PACU  Disposition:  Phase II; Home  Recovery Course: Uneventful  Follow-Up: Not required           Last Anesthesia Record Vitals:  CRNA VITALS  2019 0713 - 2019 0810      2019             Pulse:  56    Ht Rate:  0  (Abnormal)     SpO2:  99 %    Resp Rate (set):  10          Last PACU Vitals:  Vitals Value Taken Time   /100 2019  7:47 AM   Temp 36.3  C (97.4  F) 2019  7:47 AM   Pulse     Resp 16 2019  7:47 AM   SpO2 97 % 2019  7:47 AM   Temp src Available 2019  7:32 AM   NIBP 133/84 2019  7:41 AM   Pulse 56 2019  7:43 AM   SpO2 99 % 2019  7:43 AM   Resp     Temp     Ht Rate 0 2019  7:43 AM   Temp 2           Electronically Signed By: Joaquin Hong MD, MD, May 8, 2019, 8:10 AM

## 2019-05-09 ENCOUNTER — OFFICE VISIT (OUTPATIENT)
Dept: OPHTHALMOLOGY | Facility: CLINIC | Age: 59
End: 2019-05-09
Attending: OPHTHALMOLOGY
Payer: COMMERCIAL

## 2019-05-09 DIAGNOSIS — Z96.1 PSEUDOPHAKIA OF LEFT EYE: Primary | ICD-10-CM

## 2019-05-09 DIAGNOSIS — H25.11 NUCLEAR SCLEROTIC CATARACT OF RIGHT EYE: ICD-10-CM

## 2019-05-09 DIAGNOSIS — H40.052 BORDERLINE GLAUCOMA OF LEFT EYE WITH OCULAR HYPERTENSION: ICD-10-CM

## 2019-05-09 PROCEDURE — G0463 HOSPITAL OUTPT CLINIC VISIT: HCPCS | Mod: ZF

## 2019-05-09 ASSESSMENT — VISUAL ACUITY
OD_PH_SC+: -1
OS_SC: 20/20
METHOD: SNELLEN - LINEAR
OS_SC+: -1
OD_PH_SC: 20/70
OD_SC: 20/600

## 2019-05-09 ASSESSMENT — TONOMETRY
IOP_METHOD: TONOPEN
OS_IOP_MMHG: 21
OD_IOP_MMHG: 17

## 2019-05-09 ASSESSMENT — SLIT LAMP EXAM - LIDS
COMMENTS: NORMAL
COMMENTS: NORMAL

## 2019-05-09 ASSESSMENT — EXTERNAL EXAM - LEFT EYE: OS_EXAM: NORMAL

## 2019-05-09 ASSESSMENT — EXTERNAL EXAM - RIGHT EYE: OD_EXAM: NORMAL

## 2019-05-09 NOTE — NURSING NOTE
Chief Complaints and History of Present Illnesses   Patient presents with     Post Op (Ophthalmology) Left Eye      Chief Complaint(s) and History of Present Illness(es)     Post Op (Ophthalmology) Left Eye     Laterality: left eye    Associated symptoms: tearing.  Negative for redness and eye pain    Pain scale: 0/10              Comments     Left Eye Phacoemulsification with Standard Intraocular Lens 5/8/19.  Vision in left eye has improved a little.  No pain or discomfort of left eye.  Eye meds: latanoprost last gtt @ 9pm,  Acular, Ocuflox, Predforte to the right eye las gtts@ 6:30 am this morning  FARAZ Herron 5/9/2019 10:44 AM

## 2019-05-09 NOTE — PATIENT INSTRUCTIONS
Eye Drop Instructions:    Start your eye drops today.  1. Prednisolone (pink/white top) - 1 drop 4 times per day  2. Ofloxacin (tan top) - 1 drop 4 times per day  3. Ketorolac (gray top) - 1 drop 4 times per day    No eye rubbing.  No water in the eye.   Wear the shield over the eye at bedtime and sunglasses when outdoors.  Do not lift over 10-15 lbs.  Space out eye drops by at least 3-5 minutes apart.     If you have any worsening eye pain, redness, flashes/floaters, or decreased vision, please call the Eye Clinic at [phone].

## 2019-05-09 NOTE — PROGRESS NOTES
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     Seen by Dr. Ritesh Medrano O.D. 2/2019 while living with sister in [place] for the past 2 years. Told he had glaucoma and cataracts.  Office #: [phone]  Fax #: [phone]    Currently back living in [place]. Noticing that he has some blurry vision, intermittent in both eyes. Current glasses over 3 years old. Lower part of the vision \"is not doing well\" in both eyes over the past 6 months.      Interval: Here for POD1 visit s/p CE/IOL left eye. Doing well, no pain. Started drops yesterday.    POH:  Last eye exam: 6/2016 - Dr. Velazquez at [facility]    Prior eye surgery/laser: none  CTL wearer: 2 week disposables; does not sleep in lenses  Glasses: bifocals    Fam hx of eye disease: mother- glaucoma    -hx of OHTN left eye after trauma - reports multiple episodes of head trauma and black eyes as a teenager and young adult    --> s/p CE/IOL left eye 5/8/19    (per prior records from [facility]):     -Tmax 29     -previous HVF's \"normal\"     -mother and maybe grandmother with +hx of glaucoma     -gonio (2012) - no angle recession     -pachy: 596 left eye     Glaucoma work up/testing:   -C:D (DFE 3/15/19): 0.3 right eye, 0.6 left eye    -Tmax (on lumigan/latanoprost): 20, 21   -pachy: 592/601   -positive fam hx of glaucoma   -gonio open each eye   -HVF 24-2 (4/20/19): 1st test, few non-specific defects   -OCT nerve (3/15/19): right eye: G360, left eye: inf thinning, otherwise green    Gtts:  Latanoprost at bedtime each eye    Pred QID left eye  Ketorolac QID left eye  Ofloxacin QID left eye     All:  NKDA    A/P:  1. Pseudophakia, left eye  -POD1, doing well, IOP wnl, vision 20/20  -continue pred QID, ketorolac QID, ofloxacin QID  -shield at bedtime  -return precautions reviewed  -f/u 1 week    2. Glaucoma suspect, OU  -diagnosed as ocular hypertension left eye " 2/2 prior head trauma by hx; also with C:D asymmetry (0.4 vs. 0.6)  -OCT nerve (3/15/19) - shows inferior thinning left eye; normal right eye  -Tmax per prior records 29 off gtts left eye  -pachy thick each eye  -positive fam hx   -HVF 24-2 (4/20/19): initial test, few non-specific defects; monitor  -gonio (4/20/19): open to CBB each eye 360 with few inf PAS left eye; no angle recession; can visualize peripheral iris vessels, but no heme/hyphema/branching vessels of concern; likely visualized due to light iris color -appear symmetric each eye.  -given low risk of latanoprost and improvement of IOP from 29 --> 16, would continue latanoprost at bedtime each eye  -recommend repeat OCT nerve/HVF in 6 months    3. Nuclear sclerosis, right eye  -plan for CE/IOL in a few weeks due to severe aniseikonia  -monitor for now    4. Hypertension  -mild attenuation of retinal vessels each eye with no CWS, heme   -recommend tight BP control  -f/u with PCP, med compliance reviewed  -annual DFE    F/u 1 week    Lynnette Buchanan MD  PGY-5, Cornea Fellow  Ophthalmology    Complete documentation of historical and exam elements from today's encounter can be found in the full encounter summary report (not reduplicated in this progress note). I personally obtained the chief complaint(s) and history of present illness.  I confirmed and edited as necessary the review of systems, past medical/surgical history, family history, social history, and examination findings as documented by others; and I examined the patient myself. I personally reviewed the relevant tests, images, and reports as documented above. I formulated and edited as necessary the assessment and plan and discussed the findings and management plan with the patient and family. I was present for all procedures performed during this visit.    Lynnette Buchanan MD  Cornea Fellow

## 2019-05-20 ENCOUNTER — OFFICE VISIT (OUTPATIENT)
Dept: ORTHOPEDICS | Facility: CLINIC | Age: 59
End: 2019-05-20
Payer: COMMERCIAL

## 2019-05-20 ENCOUNTER — HOSPITAL ENCOUNTER (OUTPATIENT)
Facility: CLINIC | Age: 59
End: 2019-05-20
Attending: FAMILY MEDICINE
Payer: COMMERCIAL

## 2019-05-20 ENCOUNTER — TELEPHONE (OUTPATIENT)
Dept: OPHTHALMOLOGY | Facility: CLINIC | Age: 59
End: 2019-05-20

## 2019-05-20 VITALS
BODY MASS INDEX: 27.59 KG/M2 | SYSTOLIC BLOOD PRESSURE: 123 MMHG | HEIGHT: 66 IN | DIASTOLIC BLOOD PRESSURE: 79 MMHG | WEIGHT: 171.7 LBS

## 2019-05-20 DIAGNOSIS — M54.12 CERVICAL RADICULOPATHY: Primary | ICD-10-CM

## 2019-05-20 ASSESSMENT — MIFFLIN-ST. JEOR: SCORE: 1546.58

## 2019-05-20 NOTE — PROGRESS NOTES
"      SPORTS & ORTHOPEDIC WALK-IN FOLLOW-UP VISIT 5/20/2019    Interval History:    Follow up reason: Received injection on 4/12/19.    Date of injury: NA    Date last seen: 4/12/19    Following Therapeutic Plan: Yes     Pain: Improving- \"noticeable improvement but sometimes during day its as bad as it was\"     Function: Improving    Interval History: Underwent C6-7 translaminar RENU on 5/3/19. Near 100% improvement for a few days after the injection.  However since then the pain has returned and is the same as previous.  Worse pain still localizes to the lateral shoulder.  Some radiation down the arm.    Medical History:    Any recent changes to your medical history? No    Any new medication prescribed since last visit? No    Review of Systems:    Do you have fever, chills, weight loss? No    Do you have any vision problems? No    Do you have any chest pain or edema? No    Do you have any shortness of breath or wheezing?  No    Do you have stomach problems? No    Do you have any numbness or focal weakness? No    Do you have diabetes? No    Do you have problems with bleeding or clotting? No    Do you have an rashes or other skin lesions? No         Past Medical History, Current Medications, and Allergies are reviewed in the electronic medical record as appropriate.       EXAM:/79   Ht 1.676 m (5' 6\")   Wt 77.9 kg (171 lb 11.2 oz)   BMI 27.71 kg/m      General: alert, pleasant, no distress  Neck/Spine: no TTP of spinous processes in cervical spine.  Pain with neck extension.  ROM limited in tilting and rotation of the neck.  Spurlings negative  Neurologic: reports decreased sensation over lateral shoulder to light touch. SILT throughout upper extremities. Strength 5/5 and symmetric throughout upper extremities.   Upper Extremities: warm, well perfused, no edema. Full ROM without pain in shoulder, elbow, wrist, and hand. Good capillary refill bilaterally      Imaging: reviewed previous mri of cervical spine " from 4/2/19.   Per Radiology:   Impression:      1. Multilevel cervical spondylosis, most pronounced at C4-5 with  moderate to severe spinal canal stenosis and at C3-4 with moderate  spinal canal stenosis.  2. There is Moderate to severe right neural foraminal stenosis at C4-5  and severe right neural foraminal stenosis at C3-4. Further  degenerative disease as above.  3. Levoconvex scoliosis of the cervical spine, with its apex at C3-4.      Assessment: Patient is a 59 year old male with recurrent left shoulder pain after RENU on 5/3/19. Symptoms still consistent with radiculopathy in the setting of diffuse cervical spine disease.     Recommendations:   Reviewed imaging and assessment with patient in detail  Patient would like to try repeat injection. Will try C5/6 translaminar injection to see if he can get some more lasting relief. If not effective, will consider referral to spine surgery.   Patient is understanding and in agreement with this plan.     Balaji Asencio MD

## 2019-05-20 NOTE — TELEPHONE ENCOUNTER
Spoke to pt at 1647  Pt was seen today for post-op appt and drops were reviewed at visit  Pt demonstrated understanding of drops  Jose Manuel Reeder RN 4:44 PM 05/21/19        [facility] Call Center    Phone Message    May a detailed message be left on voicemail: yes    Reason for Call: Medication Question or concern regarding medication   Prescription Clarification  Name of Medication:ketorolac (ACULAR) 0.5 % ophthalmic solution , ofloxacin (OCUFLOX) 0.3 % ophthalmic solution , prednisoLONE acetate (PRED FORTE) 1 % ophthalmic suspension   Prescribing Provider: Dr. Buchanan   Pharmacy:N/A   What on the order needs clarification? Pt would like some direction in discontinuing med          Action Taken: Message routed to:  [facility] ([facility] Eye

## 2019-05-20 NOTE — PROGRESS NOTES
SPORTS & ORTHOPEDIC WALK-IN FOLLOW-UP VISIT 2019    Interval History:     Follow up reason: Left shoulder pain    Date of injury: NA    Date last seen: 19    Following Therapeutic Plan: Yes     Pain: {IMPROVING WORSENIN}    Function: {IMPROVING WORSENIN}    Interval History: ***     Medical History:    Any recent changes to your medical history? No    Any new medication prescribed since last visit? No    Review of Systems:    Do you have fever, chills, weight loss? No    Do you have any vision problems? No    Do you have any chest pain or edema? No    Do you have any shortness of breath or wheezing?  No    Do you have stomach problems? No    Do you have any numbness or focal weakness? No    Do you have diabetes? No    Do you have problems with bleeding or clotting? No    Do you have an rashes or other skin lesions? No

## 2019-05-20 NOTE — PROGRESS NOTES
"[facility]  Orthopedics  Balaji Asencio MD  2019     Name: Stas Houston  MRN: [id number]  Age: 59 year old  : 1960  Referring provider: Referred Self     Chief Complaint: No chief complaint on file.   ***    Date of Injury: ***    History of Present Illness:   Stas Houston is a 59 year old, male with a past medical history of shoulder impingement and arthroscopy in 2018 who presents today for follow-up regarding his left neck and shoulder pain consistent with cervical radiculopathy. I last evaluated him on 19, at which time he was referred to physical therapy and was given a refill of the Tramadol. Today, he notes ***    Review of Systems:   A 10-point review of systems was obtained and is negative except for as noted in the HPI.     Physical Examination:  There were no vitals taken for this visit.  ***  EXAM:  Alert, pleasant and conversational    Neck with full AROM, non-tender to midline cervical and upper thoracic spine palpation, negative Spurling's.  Elbow with FROM and non-tender to palpation      {left/right:886924} shoulder:   Skin intact. No skin changes, deformity or atrophy    AROM: Forward Flexion 180 , Abduction 180 , External rotation approximately 70  Internal rotation to T7. {POSITIVE/NEGATIVE:689065::\"negative\"} tenderness with [forward flexion and internal rotation].   {SYMMETRIC:484527:o} ROM compared to uninjured side  {SYMMETRIC:387132:o} Scapular motion    Strength testing: Abduction: 5/5, Abduction with 30  horizontal flexion and internal rotation: 5/5, External rotation: 5/5, Internal rotation 5/5. Lift off test ***   Deltoids 5/5, Biceps 5/5, Triceps 5/5,  5/5.    Palpation: {POSITIVE/NEGATIVE:168766::\"negative\"} TTP of the Acromioclavicular joint  {POSITIVE/NEGATIVE:266880::\"negative\"} TTP of Sternoclavicular joint  {POSITIVE/NEGATIVE:842114::\"negative\"} TTP of posterior glenoid  {POSITIVE/NEGATIVE:290069::\"negative\"} TTP of scapular " "borders  {POSITIVE/NEGATIVE:330123::\"negative\"} TTP of the bicipital tendon.     Special Tests: {POSITIVE/NEGATIVE:738709::\"negative\"} Ahuja test  {POSITIVE/NEGATIVE:490642::\"negative\"} Neer's test.   {POSITIVE/NEGATIVE:638332::\"negative\"}Bacon's test   {POSITIVE/NEGATIVE:324772::\"negative\"} Speed's test.    Neurovascularly intact bilateral upper extremities.      Imaging:  Radiographs of the *** - *** views (2019)  ***    I have independently reviewed the above imaging studies; the results were discussed with the patient.     Assessment:   59 year old, {RIGHT/LEFT/AMBI:814701954::\"right handed\"} male with ***    Plan:   ***    Procedure:   After written informed consent was obtained, the patient's {LEFT/RIGHT/BILATLY:615401076} *** was prepped with chloraprep.  A combination of {ORTHO INJECTION M} of {ORTHO INJ CHOICE:408158} and {NUMBERS 1-10:963066}cc {LIDOCAINE:322423040} were injected into the ***.  There were no immediate complications.      Scribe Disclosure:  Vasile ECHEVERRIA, am serving as a scribe to document services personally performed by Balaji Asencio MD at this visit, based upon the provider's statements to me. All documentation has been reviewed by the aforementioned provider prior to being entered into the official medical record. ***    Vasile ECHEVERRIA, a scribe, prepared the chart for today's encounter. ***         "

## 2019-05-21 ENCOUNTER — OFFICE VISIT (OUTPATIENT)
Dept: OPHTHALMOLOGY | Facility: CLINIC | Age: 59
End: 2019-05-21
Attending: OPHTHALMOLOGY
Payer: COMMERCIAL

## 2019-05-21 DIAGNOSIS — Z96.1 PSEUDOPHAKIA OF LEFT EYE: Primary | ICD-10-CM

## 2019-05-21 DIAGNOSIS — H40.052 BORDERLINE GLAUCOMA OF LEFT EYE WITH OCULAR HYPERTENSION: ICD-10-CM

## 2019-05-21 DIAGNOSIS — H25.11 NUCLEAR SCLEROTIC CATARACT OF RIGHT EYE: ICD-10-CM

## 2019-05-21 PROCEDURE — G0463 HOSPITAL OUTPT CLINIC VISIT: HCPCS | Mod: ZF | Performed by: TECHNICIAN/TECHNOLOGIST

## 2019-05-21 ASSESSMENT — SLIT LAMP EXAM - LIDS
COMMENTS: NORMAL
COMMENTS: NORMAL

## 2019-05-21 ASSESSMENT — TONOMETRY
OS_IOP_MMHG: 21
IOP_METHOD: ICARE
OD_IOP_MMHG: 21

## 2019-05-21 ASSESSMENT — EXTERNAL EXAM - LEFT EYE: OS_EXAM: NORMAL

## 2019-05-21 ASSESSMENT — CONF VISUAL FIELD
METHOD: COUNTING FINGERS
OD_NORMAL: 1
OS_NORMAL: 1

## 2019-05-21 ASSESSMENT — EXTERNAL EXAM - RIGHT EYE: OD_EXAM: NORMAL

## 2019-05-21 ASSESSMENT — VISUAL ACUITY
METHOD: SNELLEN - LINEAR
OD_SC: 20/200
OS_SC: 20/20
OD_PH_SC: 20/70

## 2019-05-21 NOTE — PATIENT INSTRUCTIONS
Eye Drop Instructions:    1. Prednisolone (pink/white top) - 1 drop 2 times per day for 1 week, then 1 drop once daily for 1 week, then stop.  2. Stop Ofloxacin (tan top).  3. Ketorolac (gray top) - 1 drop 2 times per day for 1 week, then 1 drop once daily for 1 week, then stop.    Space out eye drops by at least 3-5 minutes apart.     If you have any worsening eye pain, redness, flashes/floaters, or decreased vision, please call the Eye Clinic at [phone].

## 2019-05-21 NOTE — PROGRESS NOTES
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     Seen by Dr. Ritesh Medrano O.D. 2/2019 while living with sister in [place] for the past 2 years. Told he had glaucoma and cataracts.  Office #: [phone]  Fax #: [phone]    Currently back living in [place]. Noticing that he has some blurry vision, intermittent in both eyes. Current glasses over 3 years old. Lower part of the vision \"is not doing well\" in both eyes over the past 6 months.      Interval: Here for POW1.5 visit s/p CE/IOL left eye. Doing well- no pain. Vision doing well.    POH:  Last eye exam: 6/2016 - Dr. Velazquez at [place]    Prior eye surgery/laser: none  CTL wearer: 2 week disposables; does not sleep in lenses  Glasses: bifocals    Fam hx of eye disease: mother- glaucoma    -hx of OHTN left eye after trauma - reports multiple episodes of head trauma and black eyes as a teenager and young adult    --> s/p CE/IOL left eye 5/8/19    [place] [facility] [place]     [place]Tmax 29     -previous HVF's \"normal\"     -mother and maybe grandmother with +hx of glaucoma     -gonio (2012) - no angle recession     -pachy: 596 left eye     Glaucoma work up/testing:   -C:D (DFE 3/15/19): 0.3 right eye, 0.6 left eye    -Tmax (on lumigan/latanoprost): 20, 21   -pachy: 592/601   -positive fam hx of glaucoma   -gonio open each eye   -HVF 24-2 (4/20/19): 1st test, few non-specific defects   -OCT nerve (3/15/19): right eye: G360, left eye: inf thinning, otherwise green    Gtts:  Latanoprost at bedtime OD    Pred TID left eye  Ketorolac TID left eye  Ofloxacin TID left eye     All:  NKDA    A/P:  1. Pseudophakia, left eye  -POW1.5, doing well, IOP wnl, vision 20/20  -stop ofloxacin  -start pred, ketorolac taper - as instructed (already doing pred and ketorolac TID, so will just do 1 week BID, then 1 week qday, then stop)    2. Glaucoma suspect, " OU  -diagnosed as ocular hypertension left eye 2/2 prior head trauma by hx; also with C:D asymmetry (0.4 vs. 0.6)  -OCT nerve (3/15/19) - shows inferior thinning left eye; normal right eye  -Tmax per prior records 29 off gtts left eye  -pachy thick each eye  -positive fam hx   -HVF 24-2 (4/20/19): initial test, few non-specific defects; monitor  -gonio (4/20/19): open to CBB each eye 360 with few inf PAS left eye; no angle recession; can visualize peripheral iris vessels, but no heme/hyphema/branching vessels of concern; likely visualized due to light iris color -appear symmetric each eye.  -given low risk of latanoprost and improvement of IOP from 29 --> 16, would continue latanoprost at bedtime each eye (ok to restart left eye)  -recommend repeat OCT nerve/HVF in 6 months    3. Nuclear sclerosis, right eye  -plan for CE/IOL due to anisekonia next week  -target emmetropia    4. Hypertension  -mild attenuation of retinal vessels each eye with no CWS, heme   -recommend tight BP control  -f/u with PCP, med compliance reviewed  -annual DFE    CE/IOL right eye next week    Lynnette Buchanan MD  PGY-5, Cornea Fellow  Ophthalmology    Complete documentation of historical and exam elements from today's encounter can be found in the full encounter summary report (not reduplicated in this progress note). I personally obtained the chief complaint(s) and history of present illness.  I confirmed and edited as necessary the review of systems, past medical/surgical history, family history, social history, and examination findings as documented by others; and I examined the patient myself. I personally reviewed the relevant tests, images, and reports as documented above. I formulated and edited as necessary the assessment and plan and discussed the findings and management plan with the patient and family. I was present for all procedures performed during this visit.    Lynnette Buchanan MD  Cornea Fellow

## 2019-05-21 NOTE — NURSING NOTE
Chief Complaint(s) and History of Present Illness(es)     Follow Up     In left eye.              Comments     POW1 s/p CE/IOL left eye.    Patient states doing well. Vision is fine.     FARAZ Pruitt 5/21/2019 10:40 AM

## 2019-05-22 SDOH — HEALTH STABILITY: MENTAL HEALTH: HOW OFTEN DO YOU HAVE A DRINK CONTAINING ALCOHOL?: NEVER

## 2019-05-27 DIAGNOSIS — H25.11 NUCLEAR SCLEROTIC CATARACT OF RIGHT EYE: Primary | ICD-10-CM

## 2019-05-27 RX ORDER — OFLOXACIN 3 MG/ML
1 SOLUTION/ DROPS OPHTHALMIC 4 TIMES DAILY
Qty: 1 BOTTLE | Refills: 0 | Status: SHIPPED | OUTPATIENT
Start: 2019-05-27 | End: 2020-06-25

## 2019-05-27 RX ORDER — PREDNISOLONE ACETATE 10 MG/ML
1 SUSPENSION/ DROPS OPHTHALMIC 4 TIMES DAILY
Qty: 1 BOTTLE | Refills: 0 | Status: SHIPPED | OUTPATIENT
Start: 2019-05-27 | End: 2020-06-25

## 2019-05-27 RX ORDER — KETOROLAC TROMETHAMINE 5 MG/ML
1 SOLUTION OPHTHALMIC 4 TIMES DAILY
Qty: 1 BOTTLE | Refills: 0 | Status: SHIPPED | OUTPATIENT
Start: 2019-05-27 | End: 2020-06-25

## 2019-05-28 ENCOUNTER — ANESTHESIA EVENT (OUTPATIENT)
Dept: SURGERY | Facility: AMBULATORY SURGERY CENTER | Age: 59
End: 2019-05-28

## 2019-05-29 ENCOUNTER — HOSPITAL ENCOUNTER (OUTPATIENT)
Facility: AMBULATORY SURGERY CENTER | Age: 59
End: 2019-05-29
Attending: OPHTHALMOLOGY
Payer: COMMERCIAL

## 2019-05-29 ENCOUNTER — ANESTHESIA (OUTPATIENT)
Dept: SURGERY | Facility: AMBULATORY SURGERY CENTER | Age: 59
End: 2019-05-29

## 2019-05-29 VITALS
DIASTOLIC BLOOD PRESSURE: 75 MMHG | OXYGEN SATURATION: 97 % | HEIGHT: 66 IN | TEMPERATURE: 97.5 F | RESPIRATION RATE: 16 BRPM | HEART RATE: 61 BPM | SYSTOLIC BLOOD PRESSURE: 110 MMHG | BODY MASS INDEX: 27.32 KG/M2 | WEIGHT: 170 LBS

## 2019-05-29 DIAGNOSIS — H25.11 NUCLEAR SCLEROTIC CATARACT OF RIGHT EYE: Primary | ICD-10-CM

## 2019-05-29 DEVICE — EYE IMP IOL AMO PCL TECNIS ZCB00 12.0: Type: IMPLANTABLE DEVICE | Site: EYE | Status: FUNCTIONAL

## 2019-05-29 RX ORDER — TROPICAMIDE 10 MG/ML
1 SOLUTION/ DROPS OPHTHALMIC
Status: COMPLETED | OUTPATIENT
Start: 2019-05-29 | End: 2019-05-29

## 2019-05-29 RX ORDER — PHENYLEPHRINE HYDROCHLORIDE 25 MG/ML
1 SOLUTION/ DROPS OPHTHALMIC
Status: COMPLETED | OUTPATIENT
Start: 2019-05-29 | End: 2019-05-29

## 2019-05-29 RX ORDER — NALOXONE HYDROCHLORIDE 0.4 MG/ML
.1-.4 INJECTION, SOLUTION INTRAMUSCULAR; INTRAVENOUS; SUBCUTANEOUS
Status: DISCONTINUED | OUTPATIENT
Start: 2019-05-29 | End: 2019-05-30 | Stop reason: HOSPADM

## 2019-05-29 RX ORDER — FENTANYL CITRATE 50 UG/ML
INJECTION, SOLUTION INTRAMUSCULAR; INTRAVENOUS PRN
Status: DISCONTINUED | OUTPATIENT
Start: 2019-05-29 | End: 2019-05-29

## 2019-05-29 RX ORDER — SODIUM CHLORIDE, SODIUM LACTATE, POTASSIUM CHLORIDE, CALCIUM CHLORIDE 600; 310; 30; 20 MG/100ML; MG/100ML; MG/100ML; MG/100ML
INJECTION, SOLUTION INTRAVENOUS CONTINUOUS
Status: DISCONTINUED | OUTPATIENT
Start: 2019-05-29 | End: 2019-05-30 | Stop reason: HOSPADM

## 2019-05-29 RX ORDER — ONDANSETRON 2 MG/ML
4 INJECTION INTRAMUSCULAR; INTRAVENOUS EVERY 30 MIN PRN
Status: DISCONTINUED | OUTPATIENT
Start: 2019-05-29 | End: 2019-05-30 | Stop reason: HOSPADM

## 2019-05-29 RX ORDER — PROPARACAINE HYDROCHLORIDE 5 MG/ML
1 SOLUTION/ DROPS OPHTHALMIC ONCE
Status: COMPLETED | OUTPATIENT
Start: 2019-05-29 | End: 2019-05-29

## 2019-05-29 RX ORDER — OXYCODONE HYDROCHLORIDE 5 MG/1
5 TABLET ORAL EVERY 4 HOURS PRN
Status: DISCONTINUED | OUTPATIENT
Start: 2019-05-29 | End: 2019-05-30 | Stop reason: HOSPADM

## 2019-05-29 RX ORDER — FENTANYL CITRATE 50 UG/ML
25-50 INJECTION, SOLUTION INTRAMUSCULAR; INTRAVENOUS
Status: DISCONTINUED | OUTPATIENT
Start: 2019-05-29 | End: 2019-05-29 | Stop reason: HOSPADM

## 2019-05-29 RX ORDER — LIDOCAINE HYDROCHLORIDE 10 MG/ML
INJECTION, SOLUTION EPIDURAL; INFILTRATION; INTRACAUDAL; PERINEURAL PRN
Status: DISCONTINUED | OUTPATIENT
Start: 2019-05-29 | End: 2019-05-29 | Stop reason: HOSPADM

## 2019-05-29 RX ORDER — TETRACAINE HYDROCHLORIDE 5 MG/ML
SOLUTION OPHTHALMIC PRN
Status: DISCONTINUED | OUTPATIENT
Start: 2019-05-29 | End: 2019-05-29 | Stop reason: HOSPADM

## 2019-05-29 RX ORDER — CYCLOPENTOLATE HYDROCHLORIDE 10 MG/ML
1 SOLUTION/ DROPS OPHTHALMIC
Status: COMPLETED | OUTPATIENT
Start: 2019-05-29 | End: 2019-05-29

## 2019-05-29 RX ORDER — LIDOCAINE 40 MG/G
CREAM TOPICAL
Status: DISCONTINUED | OUTPATIENT
Start: 2019-05-29 | End: 2019-05-29 | Stop reason: HOSPADM

## 2019-05-29 RX ORDER — MEPERIDINE HYDROCHLORIDE 25 MG/ML
12.5 INJECTION INTRAMUSCULAR; INTRAVENOUS; SUBCUTANEOUS
Status: DISCONTINUED | OUTPATIENT
Start: 2019-05-29 | End: 2019-05-30 | Stop reason: HOSPADM

## 2019-05-29 RX ORDER — BALANCED SALT SOLUTION 6.4; .75; .48; .3; 3.9; 1.7 MG/ML; MG/ML; MG/ML; MG/ML; MG/ML; MG/ML
SOLUTION OPHTHALMIC PRN
Status: DISCONTINUED | OUTPATIENT
Start: 2019-05-29 | End: 2019-05-29 | Stop reason: HOSPADM

## 2019-05-29 RX ORDER — SODIUM CHLORIDE, SODIUM LACTATE, POTASSIUM CHLORIDE, CALCIUM CHLORIDE 600; 310; 30; 20 MG/100ML; MG/100ML; MG/100ML; MG/100ML
500 INJECTION, SOLUTION INTRAVENOUS CONTINUOUS
Status: DISCONTINUED | OUTPATIENT
Start: 2019-05-29 | End: 2019-05-29 | Stop reason: HOSPADM

## 2019-05-29 RX ORDER — ONDANSETRON 4 MG/1
4 TABLET, ORALLY DISINTEGRATING ORAL EVERY 30 MIN PRN
Status: DISCONTINUED | OUTPATIENT
Start: 2019-05-29 | End: 2019-05-30 | Stop reason: HOSPADM

## 2019-05-29 RX ORDER — ACETAMINOPHEN 325 MG/1
975 TABLET ORAL ONCE
Status: COMPLETED | OUTPATIENT
Start: 2019-05-29 | End: 2019-05-29

## 2019-05-29 RX ADMIN — TROPICAMIDE 1 DROP: 10 SOLUTION/ DROPS OPHTHALMIC at 06:20

## 2019-05-29 RX ADMIN — CYCLOPENTOLATE HYDROCHLORIDE 1 DROP: 10 SOLUTION/ DROPS OPHTHALMIC at 06:26

## 2019-05-29 RX ADMIN — ACETAMINOPHEN 975 MG: 325 TABLET ORAL at 06:25

## 2019-05-29 RX ADMIN — PHENYLEPHRINE HYDROCHLORIDE 1 DROP: 25 SOLUTION/ DROPS OPHTHALMIC at 06:27

## 2019-05-29 RX ADMIN — TROPICAMIDE 1 DROP: 10 SOLUTION/ DROPS OPHTHALMIC at 06:25

## 2019-05-29 RX ADMIN — PHENYLEPHRINE HYDROCHLORIDE 1 DROP: 25 SOLUTION/ DROPS OPHTHALMIC at 06:22

## 2019-05-29 RX ADMIN — CYCLOPENTOLATE HYDROCHLORIDE 1 DROP: 10 SOLUTION/ DROPS OPHTHALMIC at 06:18

## 2019-05-29 RX ADMIN — TROPICAMIDE 1 DROP: 10 SOLUTION/ DROPS OPHTHALMIC at 06:15

## 2019-05-29 RX ADMIN — PROPARACAINE HYDROCHLORIDE 1 DROP: 5 SOLUTION/ DROPS OPHTHALMIC at 06:14

## 2019-05-29 RX ADMIN — CYCLOPENTOLATE HYDROCHLORIDE 1 DROP: 10 SOLUTION/ DROPS OPHTHALMIC at 06:23

## 2019-05-29 RX ADMIN — FENTANYL CITRATE 50 MCG: 50 INJECTION, SOLUTION INTRAMUSCULAR; INTRAVENOUS at 07:17

## 2019-05-29 RX ADMIN — SODIUM CHLORIDE, SODIUM LACTATE, POTASSIUM CHLORIDE, CALCIUM CHLORIDE 500 ML: 600; 310; 30; 20 INJECTION, SOLUTION INTRAVENOUS at 06:39

## 2019-05-29 RX ADMIN — PHENYLEPHRINE HYDROCHLORIDE 1 DROP: 25 SOLUTION/ DROPS OPHTHALMIC at 06:18

## 2019-05-29 ASSESSMENT — LIFESTYLE VARIABLES: TOBACCO_USE: 1

## 2019-05-29 ASSESSMENT — MIFFLIN-ST. JEOR: SCORE: 1538.86

## 2019-05-29 NOTE — ANESTHESIA CARE TRANSFER NOTE
Patient: Stas Houston    Procedure(s):  Right Eye Phacoemulsification with Standard Intraocular Lens    Diagnosis: Visually Significant Cataracts, Bilateral  Diagnosis Additional Information: No value filed.    Anesthesia Type:   No value filed.     Note:  Airway :Room Air  Patient transferred to:Phase II  Handoff Report: Identifed the Patient, Identified the Reponsible Provider, Reviewed the pertinent medical history, Discussed the surgical course, Reviewed Intra-OP anesthesia mangement and issues during anesthesia, Set expectations for post-procedure period and Allowed opportunity for questions and acknowledgement of understanding      Vitals: (Last set prior to Anesthesia Care Transfer)    CRNA VITALS  5/29/2019 0715 - 5/29/2019 0749      5/29/2019             Pulse:  63    SpO2:  95 %    Resp Rate (set):  10                Electronically Signed By: PAULO Oh CRNA  May 29, 2019  7:49 AM

## 2019-05-29 NOTE — DISCHARGE INSTRUCTIONS
"[facility]  Home Care Following Anesthesia  For 24 hours after surgery:  1. Get plenty of rest.  A responsible adult must stay with you for at least 24 hours after you leave the surgery center.  2. Do not drive or use heavy equipment.  If you have weakness or tingling, don't drive or use heavy equipment until this feeling goes away.   3. Do not drink alcohol.   4. Avoid strenuous or risky activities.  Ask for help when climbing stairs.  5. You may feel lightheaded.  IF so, sit for a few minutes before standing.  Have someone help you get up.   6. If you have nausea (feel sick to your stomach): Drink only clear liquids such as apple juice, ginger ale, broth or 7-Up.  Rest may also help.  Be sure to drink enough fluids.  Move to a regular diet as you feel able.   7. You may have a slight fever.  Call the doctor if your fever is over 100 F (37.7 C) (taken under the tongue) or lasts longer than 24 hours.  8. You may have a dry mouth, a sore throat, muscle aches or trouble sleeping. These should go away after 24 hours.  9. Do not make important or legal decisions.        Today you received a Marcaine or bupivacaine block to numb the nerves near your surgery site.  This is a block using local anesthetic or \"numbing\" medication injected around the nerves to anesthetize or \"numb\" the area supplied by those nerves.  This block is injected into the muscle layer near your surgical site.  The medication may numb the location where you had surgery for 6-18 hours, but may last up to 24 hours.  If your surgical site is an arm or leg you should be careful with your affected limb, since it is possible to injure your limb without being aware of it due to the numbing.  Until full feeling returns, you should guard against bumping or hitting your limb, and avoid extreme hot or cold temperatures on the skin.  As the block wears off, the feeling will return as a tingling or prickly sensation near " your surgical site.  You will experience more discomfort from your incision as the feeling returns.  You may want to take a pain pill (a narcotic or Tylenol if this was prescribed by your surgeon) when you start to experience mild pain before the pain beccomes more severe.  If your pain medications do not control your pain you should notifiy your surgeon.    Tips for taking pain medications  To get the best pain relief possible, remember these points:    Take pain medications as directed, before pain becomes severe.    Pain medication can upset your stomach: taking it with food may help.    Constipation is a common side effect of pain medication. Drink plenty of  fluids.    Eat foods high in fiber. Take a stool softener if recommended by your doctor or pharmacist.    Do not drink alcohol, drive or operate machinery while taking pain medications.    Ask about other ways to control pain, such as with heat, ice or relaxation.    Tylenol/Acetaminophen Consumption  To help encourage the safe use of acetaminophen, the makers of TYLENOL  have lowered the maximum daily dose for single-ingredient Extra Strength TYLENOL  (acetaminophen) products sold in the [place] from 8 pills per day (4,000 mg) to 6 pills per day (3,000 mg). The dosing interval has also changed from 2 pills every 4-6 hours to 2 pills every 6 hours.    If you feel your pain relief is insufficient, you may take Tylenol/Acetaminophen in addition to your narcotic pain medication.     Be careful not to exceed 3,000 mg of Tylenol/Acetaminophen in a 24 hour period from all sources.    If you are taking extra strength Tylenol/acetaminophen (500 mg), the maximum dose is 6 tablets in 24 hours.    If you are taking regular strength acetaminophen (325 mg), the maximum dose is 9 tablets in 24 hours.    Call a doctor for any of the followin. Signs of infection (fever, growing tenderness at the surgery site, a large amount of drainage or bleeding, severe pain,  foul-smelling drainage, redness, swelling).  2. It has been over 8 to 10 hours since surgery and you are still not able to urinate (pass water).  3. Headache for over 24 hours.  4. Numbness, tingling or weakness the day after surgery (if you had spinal anesthesia).  Your doctor is:       Dr. Lynnette Buchanan, Opthalmology: [phone]               Or dial [phone] and ask for the resident on call for:  Ophthalmology  For emergency care, call the:  [facility]:  [phone] (TTY for hearing impaired: [phone])

## 2019-05-29 NOTE — ANESTHESIA POSTPROCEDURE EVALUATION
Anesthesia POST Procedure Evaluation    Patient: Stas Houston   MRN:     [id number] Gender:   male   Age:    59 year old :      1960        Preoperative Diagnosis: Visually Significant Cataracts, Bilateral   Procedure(s):  Right Eye Phacoemulsification with Standard Intraocular Lens   Postop Comments: No value filed.       Anesthesia Type:  General  No value filed.    Reportable Event: NO     PAIN: Uncomplicated   Sign Out status: Comfortable, Well controlled pain     PONV: No PONV   Sign Out status:  No Nausea or Vomiting     Neuro/Psych: Uneventful perioperative course   Sign Out Status: Preoperative baseline; Age appropriate mentation     Airway/Resp.: Uneventful perioperative course   Sign Out Status: Non labored breathing, age appropriate RR; Resp. Status within EXPECTED Parameters     CV: Uneventful perioperative course   Sign Out status: Appropriate BP and perfusion indices; Appropriate HR/Rhythm     Disposition:   Sign Out in:  PACU  Disposition:  Phase II; Home  Recovery Course: Uneventful  Follow-Up: Not required           Last Anesthesia Record Vitals:  CRNA VITALS  2019 0715 - 2019 0815      2019             Pulse:  63    SpO2:  95 %    Resp Rate (set):  10          Last PACU Vitals:  Vitals Value Taken Time   BP     Temp     Pulse     Resp     SpO2     Temp src Available 2019  7:37 AM   NIBP 101/72 2019  7:41 AM   Pulse 63 2019  7:45 AM   SpO2 95 % 2019  7:45 AM   Resp     Temp     Ht Rate 59 2019  7:44 AM   Temp 2           Electronically Signed By: Jose Armando Sheridan DO, May 29, 2019, 9:24 AM

## 2019-05-29 NOTE — OP NOTE
Operative Report    DATE OF OPERATION: 5/29/2019    PRE-OPERATIVE DIAGNOSIS: Visually significant cataract, right eye    POST-OPERATIVE DIAGNOSIS: Same    PROCEDURE: Phacoemulsification of cataract and insertion of intraocular lens, right eye    ATTENDING: Lynnette Buchanan MD    FINDINGS: None    COMPLICATIONS: None    BLOOD LOSS: <1cc    IMPLANTS: ZCB00 12.0 D    INDICATION FOR PROCEDURE   The patient has a visually-significant cataract that has been affecting their activities of daily living. The risks, including, but not limited to infection, loss of vision, loss of eye, need for more surgery, and bleeding, along with the benefits, alternatives, expectations, and the procedure itself were discussed at length with the patient who wished to proceed with surgery.     DESCRIPTION OF PROCEDURE   In the preoperative suite, the patient was identified, the surgical site marked and informed consent was obtained. The patient was the brought back to the operative suite where the appropriate anesthesia monitors were connected. The patient's eye was prepped and draped in the usual sterile fashion for ophthalmic surgery.   A 0.12 forceps and a supersharp blade were used to make a paracentesis incision. Aqueous was replaced with 1% preservative-free lidocaine, and then viscoat.  A 2.85 mm keratome was used to make a temporal, triplanar clear corneal incision. Capsulorrhexis was completed with a bent cystitome and Utrata forceps. Hydrodissection of the nucleus was achieved with the hydrodissection cannula. The nucleus was found to move freely within the capsular bag. The cataract was removed using the horizontal chop technique.   The irrigation and aspiration handpiece was used to remove the cortex. The capsular bag was filled with Provisc. The intraocular lens was injected into the capsular bag. The remaining viscoelastic was removed using irrigation and aspiration. The lens was found to be well-centered and in the capsular  bag. BSS on a cannula was used to hydrate the clear corneal and paracentesis incisions. Weck-dylan sponges were used to confirm there were no leaks from the incisions.   Topical prednisolone and ofloxacin were applied on the eye. A shield was taped over the eye. The patient was then taken to the recovery room in stable condition having tolerated the procedure well and discharged home in good condition.   Dr. Lynnette Buchanan was scrubbed and performed the entire case.

## 2019-05-30 ENCOUNTER — OFFICE VISIT (OUTPATIENT)
Dept: OPHTHALMOLOGY | Facility: CLINIC | Age: 59
End: 2019-05-30
Attending: OPHTHALMOLOGY
Payer: COMMERCIAL

## 2019-05-30 DIAGNOSIS — Z96.1 PSEUDOPHAKIA OF BOTH EYES: Primary | ICD-10-CM

## 2019-05-30 PROCEDURE — G0463 HOSPITAL OUTPT CLINIC VISIT: HCPCS | Mod: ZF

## 2019-05-30 ASSESSMENT — SLIT LAMP EXAM - LIDS
COMMENTS: NORMAL
COMMENTS: NORMAL

## 2019-05-30 ASSESSMENT — EXTERNAL EXAM - RIGHT EYE: OD_EXAM: NORMAL

## 2019-05-30 ASSESSMENT — TONOMETRY
OD_IOP_MMHG: 25
OD_IOP_MMHG: 24
IOP_METHOD: TONOPEN
IOP_METHOD: TONOPEN
OS_IOP_MMHG: 24
OS_IOP_MMHG: 24

## 2019-05-30 ASSESSMENT — EXTERNAL EXAM - LEFT EYE: OS_EXAM: NORMAL

## 2019-05-30 ASSESSMENT — VISUAL ACUITY
OD_SC: 20/20
METHOD: SNELLEN - LINEAR
OS_SC: 20/20
OS_SC+: -1

## 2019-05-30 NOTE — PROGRESS NOTES
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     Seen by Dr. Ritesh Medrano O.D. 2/2019 while living with sister in [place] for the past 2 years. Told he had glaucoma and cataracts.  Office #: [phone]  Fax #: [phone]    Currently back living in [place]. Noticing that he has some blurry vision, intermittent in both eyes. Current glasses over 3 years old. Lower part of the vision \"is not doing well\" in both eyes over the past 6 months.      Interval: Here for POD1 visit s/p Ce/IOL right eye. Doing great, no pain. Happy with vision - \"like I'm wearing contact lenses but don't need them anymore.\"    POH:  Last eye exam: 6/2016 - Dr. Velazquez at [place]    Prior eye surgery/laser: none  CTL wearer: 2 week disposables; does not sleep in lenses  Glasses: bifocals    Fam hx of eye disease: mother- glaucoma    -hx of OHTN left eye after trauma - reports multiple episodes of head trauma and black eyes as a teenager and young adult    --> s/p CE/IOL left eye 5/8/19  --> s/p CE/IOL right eye 5/29/19    [place] [facility] [place]     [place]Tmax 29     -previous HVF's \"normal\"     -mother and maybe grandmother with +hx of glaucoma     -gonio (2012) - no angle recession     -pachy: 596 left eye     Glaucoma work up/testing:   -C:D (DFE 3/15/19): 0.3 right eye, 0.6 left eye    -Tmax (on lumigan/latanoprost): 20, 21   -pachy: 592/601   -positive fam hx of glaucoma   -gonio open each eye   -HVF 24-2 (4/20/19): 1st test, few non-specific defects   -OCT nerve (3/15/19): right eye: G360, left eye: inf thinning, otherwise green    Gtts:  Latanoprost at bedtime OU    Pred TID left eye  Ketorolac TID left eye  Ofloxacin TID left eye     All:  NKDA    A/P:  1. Pseudophakia, right eye   -POD1 - doing well  -continue pred QID, ofloxacin QID, ketorolac QID right eye  -shield at bedtime  -return precautions " reviewed    2. Pseudophakia, left eye  -POW3.5, healing well  -continue pred and ketorolac taper    3. Glaucoma suspect, each eye  -IOP today 24 each eye - use latanoprost at bedtime each eye   -diagnosed as ocular hypertension left eye 2/2 prior head trauma by hx; also with C:D asymmetry (0.4 vs. 0.6)  -OCT nerve (3/15/19) - shows inferior thinning left eye; normal right eye  -Tmax per prior records 29 off gtts left eye  -pachy thick each eye  -positive fam hx   -HVF 24-2 (4/20/19): initial test, few non-specific defects; monitor  -gonio (4/20/19): open to CBB each eye 360 with few inf PAS left eye; no angle recession; can visualize peripheral iris vessels, but no heme/hyphema/branching vessels of concern; likely visualized due to light iris color -appear symmetric each eye.  -given low risk of latanoprost and improvement of IOP from 29 --> 16, would continue latanoprost at bedtime each eye (ok to restart left eye)  -recommend repeat OCT nerve/HVF in 6 months    4. Hypertension  -mild attenuation of retinal vessels each eye with no CWS, heme   -recommend tight BP control  -f/u with PCP, med compliance reviewed  -annual DFE    F/u 1 week    Lynnette Buchanan MD  PGY-5, Cornea Fellow  Ophthalmology    Complete documentation of historical and exam elements from today's encounter can be found in the full encounter summary report (not reduplicated in this progress note). I personally obtained the chief complaint(s) and history of present illness.  I confirmed and edited as necessary the review of systems, past medical/surgical history, family history, social history, and examination findings as documented by others; and I examined the patient myself. I personally reviewed the relevant tests, images, and reports as documented above. I formulated and edited as necessary the assessment and plan and discussed the findings and management plan with the patient and family. I was present for all procedures performed during this  visit.    Lynnette Buchanan MD  Cornea Fellow

## 2019-05-30 NOTE — PATIENT INSTRUCTIONS
Eye Drop Instructions:    Right eye:  1. Prednisolone (pink/white top) - 1 drop 4 times per day  2. Ofloxacin (tan top) - 1 drop 4 times per day  3. Ketorolac (gray top) - 1 drop 4 times per day    Left eye:  1. Continue prednisolone - 1 drop once daily for 1 week, then stop.  2. Continue ketorolac - 1 drop once daily for 1 week, then stop.     No eye rubbing.  No water in the eye.   Wear the shield over the RIGHT eye at bedtime and sunglasses when outdoors.  Do not lift over 10-15 lbs.  Space out eye drops by at least 3-5 minutes apart.     If you have any worsening eye pain, redness, flashes/floaters, or decreased vision, please call the Eye Clinic at [phone].

## 2019-05-30 NOTE — NURSING NOTE
Chief Complaints and History of Present Illnesses   Patient presents with     Post Op (Ophthalmology) Right Eye     Chief Complaint(s) and History of Present Illness(es)     Post Op (Ophthalmology) Right Eye     Laterality: right eye    Onset: 1 day ago    Timing: throughout the day    Course: rapidly improving    Associated symptoms: Negative for eye pain, dryness, tearing, redness, fever and headache    Pain scale: 0/10              Comments     Pt states doing well. States instilling eye meds as instructed.  Amaya DE LEON 8:35 AM 05/30/2019

## 2019-05-31 RX ORDER — DEXTROSE MONOHYDRATE 25 G/50ML
25-50 INJECTION, SOLUTION INTRAVENOUS
Status: CANCELLED | OUTPATIENT
Start: 2019-05-31

## 2019-05-31 RX ORDER — NICOTINE POLACRILEX 4 MG
15-30 LOZENGE BUCCAL
Status: CANCELLED | OUTPATIENT
Start: 2019-05-31

## 2019-06-04 ENCOUNTER — OFFICE VISIT (OUTPATIENT)
Dept: OPHTHALMOLOGY | Facility: CLINIC | Age: 59
End: 2019-06-04
Attending: OPHTHALMOLOGY
Payer: COMMERCIAL

## 2019-06-04 DIAGNOSIS — H40.052 BORDERLINE GLAUCOMA OF LEFT EYE WITH OCULAR HYPERTENSION: Primary | ICD-10-CM

## 2019-06-04 DIAGNOSIS — Z96.1 PSEUDOPHAKIA OF BOTH EYES: ICD-10-CM

## 2019-06-04 PROCEDURE — G0463 HOSPITAL OUTPT CLINIC VISIT: HCPCS | Mod: ZF

## 2019-06-04 RX ORDER — TIMOLOL MALEATE 5 MG/ML
1 SOLUTION/ DROPS OPHTHALMIC DAILY
Qty: 1 BOTTLE | Refills: 1 | Status: SHIPPED | OUTPATIENT
Start: 2019-06-04 | End: 2019-06-28

## 2019-06-04 ASSESSMENT — VISUAL ACUITY
OS_SC: 20/25
OS_SC+: +2
OD_SC: 20/20
METHOD: SNELLEN - LINEAR
OD_SC+: -1

## 2019-06-04 ASSESSMENT — TONOMETRY
OS_IOP_MMHG: 27
OD_IOP_MMHG: 27
IOP_METHOD: ICARE

## 2019-06-04 NOTE — PATIENT INSTRUCTIONS
Eye Drop Instructions:    Right eye:  1. Prednisolone (pink/white top) - 1 drop 3 times per day for 1 week, 1 drop 2 times per day for 1 week, then 1 drop once daily for 1 week, then stop.  2. Ofloxacin (tan top) - stop.  3. Ketorolac (gray top) - 1 drop 3 times per day for 1 week, 1 drop 2 times per day for 1 week, then 1 drop once daily for 1 week, then stop.    Left eye:  1. Continue prednisolone (pink/white top) - 1 drop once daily for 1 week, then stop.  2. Continue ketorolac (gray top) - 1 drop once daily 1 week, then stop.     Both eyes:  1. Latanoprost (green top) - 1 drop at bedtime to both eyes.  2. Start timolol (yellow top) - 1 drop in the morning in both eyes.     If you have any worsening eye pain, redness, flashes/floaters, or decreased vision, please call the Eye Clinic at [phone].

## 2019-06-04 NOTE — NURSING NOTE
Chief Complaints and History of Present Illnesses   Patient presents with     Post Op (Ophthalmology) Right Eye     Chief Complaint(s) and History of Present Illness(es)     Post Op (Ophthalmology) Right Eye     Laterality: right eye    Onset: gradual    Onset: weeks ago    Quality: va is doing good      Frequency: constantly    Associated symptoms: Negative for dryness, redness and tearing    Pain scale: 0/10              Comments     Krystle DE LEON 10:02 AM June 4, 2019

## 2019-06-05 ASSESSMENT — EXTERNAL EXAM - LEFT EYE: OS_EXAM: NORMAL

## 2019-06-05 ASSESSMENT — EXTERNAL EXAM - RIGHT EYE: OD_EXAM: NORMAL

## 2019-06-05 ASSESSMENT — SLIT LAMP EXAM - LIDS
COMMENTS: NORMAL
COMMENTS: NORMAL

## 2019-06-05 NOTE — PROGRESS NOTES
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     Seen by Dr. Ritesh Medrano O.D. 2/2019 while living with sister in [place] for the past 2 years. Told he had glaucoma and cataracts.  Office #: [phone]  Fax #: [phone]    Currently back living in [place]. Noticing that he has some blurry vision, intermittent in both eyes. Current glasses over 3 years old. Lower part of the vision \"is not doing well\" in both eyes over the past 6 months.      Interval: Here for POW1 visit s/p Ce/IOL right eye. Doing well-happy with vision. No issues.    POH:  Last eye exam: 6/2016 - Dr. Velazquez at [place]    Prior eye surgery/laser: none  CTL wearer: 2 week disposables; does not sleep in lenses  Glasses: bifocals    Fam hx of eye disease: mother- glaucoma    -hx of OHTN left eye after trauma - reports multiple episodes of head trauma and black eyes as a teenager and young adult    --> s/p CE/IOL left eye 5/8/19  --> s/p CE/IOL right eye 5/29/19    ([facility]     -Tmax 29     -previous HVF's \"normal\"     -mother and maybe grandmother with +hx of glaucoma     -gonio (2012) - no angle recession     -pachy: 596 left eye     Glaucoma work up/testing:   -C:D (DFE 3/15/19): 0.3 right eye, 0.6 left eye    -Tmax (on lumigan/latanoprost): 20, 21   -pachy: 592/601   -positive fam hx of glaucoma   -gonio open each eye   -HVF 24-2 (4/20/19): 1st test, few non-specific defects   -OCT nerve (3/15/19): right eye: G360, left eye: inf thinning, otherwise green    Gtts:  Latanoprost at bedtime OU    Pred qday left eye  Ketorolac qdat left eye  Ofloxacin qday left eye    Pred QID right eye  Ketorolac QID right eye  Ofloxacin QID OD     All:  NKDA    A/P:  1. Pseudophakia, right eye   -POW1 - doing well  -stop ofloxacin  -start pred, ketorolac taper (see instructions)  -ok to stop shield at " bedtime  -return precautions reviewed    2. Pseudophakia, left eye  -POW3.5, healing well  -continue pred and ketorolac taper (currently on once daily of pred and ketorolac --> finish 1 week, then stop)    3. Glaucoma suspect, each eye  -IOP 27 each eye on latanoprost at bedtime each eye  -start timolol qAM each eye     -diagnosed as ocular hypertension left eye 2/2 prior head trauma by hx; also with C:D asymmetry (0.4 vs. 0.6)  -OCT nerve (3/15/19) - shows inferior thinning left eye; normal right eye  -Tmax per prior records 29 off gtts left eye  -pachy thick each eye  -positive fam hx   -HVF 24-2 (4/20/19): initial test, few non-specific defects; monitor  -gonio (4/20/19): open to CBB each eye 360 with few inf PAS left eye; no angle recession; can visualize peripheral iris vessels, but no heme/hyphema/branching vessels of concern; likely visualized due to light iris color -appear symmetric each eye.    4. Hypertension  -mild attenuation of retinal vessels each eye with no CWS, heme   -recommend tight BP control  -f/u with PCP, med compliance reviewed  -annual DFE    F/u 3 weeks - MRx, DFE OU    Lynnette Buchanan MD  PGY-5, Cornea Fellow  Ophthalmology    Complete documentation of historical and exam elements from today's encounter can be found in the full encounter summary report (not reduplicated in this progress note). I personally obtained the chief complaint(s) and history of present illness.  I confirmed and edited as necessary the review of systems, past medical/surgical history, family history, social history, and examination findings as documented by others; and I examined the patient myself. I personally reviewed the relevant tests, images, and reports as documented above. I formulated and edited as necessary the assessment and plan and discussed the findings and management plan with the patient and family. I was present for all procedures performed during this visit.    Lynnette Buchanan MD  Cornea  Fellow

## 2019-06-07 ENCOUNTER — HOSPITAL ENCOUNTER (OUTPATIENT)
Facility: CLINIC | Age: 59
Discharge: HOME | End: 2019-06-07
Admitting: PHYSICIAN ASSISTANT
Payer: COMMERCIAL

## 2019-06-07 ENCOUNTER — HOSPITAL ENCOUNTER (OUTPATIENT)
Dept: GENERAL RADIOLOGY | Facility: CLINIC | Age: 59
End: 2019-06-07
Attending: FAMILY MEDICINE
Payer: COMMERCIAL

## 2019-06-07 VITALS
BODY MASS INDEX: 27.32 KG/M2 | DIASTOLIC BLOOD PRESSURE: 75 MMHG | HEART RATE: 70 BPM | WEIGHT: 170 LBS | SYSTOLIC BLOOD PRESSURE: 131 MMHG | TEMPERATURE: 96.7 F | OXYGEN SATURATION: 94 % | HEIGHT: 66 IN | RESPIRATION RATE: 18 BRPM

## 2019-06-07 DIAGNOSIS — M54.12 CERVICAL RADICULOPATHY: ICD-10-CM

## 2019-06-07 PROCEDURE — 27210986 XR CERVICAL/THORACIC EPIDURAL INJ, INCL IMAGING

## 2019-06-07 PROCEDURE — 40000863 ZZH STATISTIC RADIOLOGY XRAY, US, CT, MAR, NM

## 2019-06-07 PROCEDURE — 25000125 ZZHC RX 250: Performed by: PHYSICIAN ASSISTANT

## 2019-06-07 PROCEDURE — 25000128 H RX IP 250 OP 636: Performed by: PHYSICIAN ASSISTANT

## 2019-06-07 PROCEDURE — 25000128 H RX IP 250 OP 636: Performed by: FAMILY MEDICINE

## 2019-06-07 RX ORDER — IOPAMIDOL 612 MG/ML
15 INJECTION, SOLUTION INTRATHECAL ONCE
Status: COMPLETED | OUTPATIENT
Start: 2019-06-07 | End: 2019-06-07

## 2019-06-07 RX ORDER — NICOTINE POLACRILEX 4 MG
15-30 LOZENGE BUCCAL
Status: DISCONTINUED | OUTPATIENT
Start: 2019-06-07 | End: 2019-06-07 | Stop reason: HOSPADM

## 2019-06-07 RX ORDER — IOPAMIDOL 408 MG/ML
10 INJECTION, SOLUTION INTRATHECAL ONCE
Status: DISCONTINUED | OUTPATIENT
Start: 2019-06-07 | End: 2019-06-07 | Stop reason: CLARIF

## 2019-06-07 RX ORDER — DEXTROSE MONOHYDRATE 25 G/50ML
25-50 INJECTION, SOLUTION INTRAVENOUS
Status: CANCELLED | OUTPATIENT
Start: 2019-06-07

## 2019-06-07 RX ORDER — NICOTINE POLACRILEX 4 MG
15-30 LOZENGE BUCCAL
Status: CANCELLED | OUTPATIENT
Start: 2019-06-07

## 2019-06-07 RX ORDER — DEXAMETHASONE SODIUM PHOSPHATE 10 MG/ML
10 INJECTION, SOLUTION INTRAMUSCULAR; INTRAVENOUS ONCE
Status: COMPLETED | OUTPATIENT
Start: 2019-06-07 | End: 2019-06-07

## 2019-06-07 RX ORDER — DEXTROSE MONOHYDRATE 25 G/50ML
25-50 INJECTION, SOLUTION INTRAVENOUS
Status: DISCONTINUED | OUTPATIENT
Start: 2019-06-07 | End: 2019-06-07 | Stop reason: HOSPADM

## 2019-06-07 RX ADMIN — LIDOCAINE HYDROCHLORIDE 2 ML: 10 INJECTION, SOLUTION EPIDURAL; INFILTRATION; INTRACAUDAL; PERINEURAL at 15:04

## 2019-06-07 RX ADMIN — DEXAMETHASONE SODIUM PHOSPHATE 20 MG: 10 INJECTION, SOLUTION INTRAMUSCULAR; INTRAVENOUS at 15:05

## 2019-06-07 RX ADMIN — IOPAMIDOL 3 ML: 612 INJECTION, SOLUTION INTRATHECAL at 15:11

## 2019-06-07 ASSESSMENT — MIFFLIN-ST. JEOR: SCORE: 1538.86

## 2019-06-07 NOTE — PROGRESS NOTES
Care Suites Admission Nursing Note    Reason for admission: Cervical Epidural injection  CS arrival time: 2:15  Accompanied by: self  Name/phone of DC : [employer] will take back to [facility]  Medications held: None  Consent signed: will do in radiology  Abnormal assessment/labs: None ordered  Education/questions answered: Yes  Plan: To radiology for cervical epidural injection

## 2019-06-07 NOTE — PROGRESS NOTES
Care Suites Post-Procedure Note    Procedure: Cervical epidural injection  Accompanied by: self  Concerns/abnormal assessment after procedure: None  Plan: Discharge in approximately thirty minutes    Care Suites Discharge Nursing Note    Education/questions answered: Yes  Patient DC location: [facility] will take back to [facility] discharge time: 15:58

## 2019-06-07 NOTE — DISCHARGE INSTRUCTIONS
Steroid Injection Discharge Instructions     After you go home:      You may resume your normal diet.    Care of Puncture Site:      If you have a bandaid on your puncture site, you may remove it the next morning    You may shower tomorrow    No bath tubs, whirlpools or swimming for at least 3 days     Activity:      You may go back to normal activity in 24 hours    You should let pain be your guide as to the extent of your activities    Maintain any activity limitations as ordered by your provider    Do NOT drive a vehicle if you develop numbness in your arm or leg    Medicines:      You may resume all medications    Resume your Platelet Inhibitors and Aspirin tomorrow at your regular dose    For minor pain, you may take Acetaminophen (Tylenol) or Ibuprofen (Advil)    Pain:       You may experience increased or different pain over the next 24-48 hours    For the next 48 hrs - you may use ice packs for discomfort     Call your primary care doctor if:      You have severe pain that does not improve with pain medication    You have chills or a fever greater than 101 F (38 C)    The site is red, swollen, hot or tender    New problems with your bowel or bladder    Any questions or concerns    Other Instructions:      New numbness down your arm or leg post injection is temporary and may last for up to 6 hours. You may need assistance with activity until your leg has normal sensation.    If you are diabetic, monitor your blood sugar closely. Contact the provider who manages your diabetes to help you control your blood sugar if needed.    For Your Information:      A steroid was injected to help decrease swelling and may help to reduce pain. It may take up to 7-10 days to obtain full results.    Some patients will get lasting relief from a single injection. Others may require up to 3 injections to get results. If you have more than one steroid injection, they should be given 2 weeks apart.    Side effects of your steroid  injection are mild and will go away in 2-3 days  - Insomnia  - Heartburn  - Flushed face  - Water retention  - Increased appetite  - Increased blood sugar      If you have questions call:        [facility] @ [phone]      The provider who performed your procedure was _________________.

## 2019-06-18 ENCOUNTER — TELEPHONE (OUTPATIENT)
Dept: OPHTHALMOLOGY | Facility: CLINIC | Age: 59
End: 2019-06-18

## 2019-06-18 DIAGNOSIS — H40.051 BORDERLINE GLAUCOMA OF RIGHT EYE WITH OCULAR HYPERTENSION: ICD-10-CM

## 2019-06-18 RX ORDER — LATANOPROST 50 UG/ML
1 SOLUTION/ DROPS OPHTHALMIC AT BEDTIME
Qty: 1 BOTTLE | Refills: 11 | Status: SHIPPED | OUTPATIENT
Start: 2019-06-18 | End: 2020-06-25

## 2019-06-18 NOTE — TELEPHONE ENCOUNTER
[facility] Call Center    Phone Message    May a detailed message be left on voicemail: yes    Reason for Call: Other: Pt is needing Dr. Lynnette Buchanan that pt is out of his medication of latanprost 0.005%, pt has a new pharmacy, [facility] andthe ph is [phone], thank you     Action Taken: Message routed to:  [facility] ([facility] Eye

## 2019-06-28 ENCOUNTER — OFFICE VISIT (OUTPATIENT)
Dept: OPHTHALMOLOGY | Facility: CLINIC | Age: 59
End: 2019-06-28
Attending: OPHTHALMOLOGY
Payer: COMMERCIAL

## 2019-06-28 DIAGNOSIS — Z96.1 PSEUDOPHAKIA OF BOTH EYES: Primary | ICD-10-CM

## 2019-06-28 DIAGNOSIS — H40.052 BORDERLINE GLAUCOMA OF LEFT EYE WITH OCULAR HYPERTENSION: ICD-10-CM

## 2019-06-28 PROCEDURE — 92015 DETERMINE REFRACTIVE STATE: CPT | Mod: ZF

## 2019-06-28 PROCEDURE — G0463 HOSPITAL OUTPT CLINIC VISIT: HCPCS | Mod: ZF

## 2019-06-28 RX ORDER — TIMOLOL MALEATE 5 MG/ML
1 SOLUTION/ DROPS OPHTHALMIC DAILY
Qty: 1 BOTTLE | Refills: 11 | Status: SHIPPED | OUTPATIENT
Start: 2019-06-28 | End: 2020-06-17

## 2019-06-28 ASSESSMENT — CUP TO DISC RATIO
OS_RATIO: 0.6
OD_RATIO: 0.4

## 2019-06-28 ASSESSMENT — VISUAL ACUITY
OD_SC+: +2
OS_SC+: -1
OD_SC: 20/25
OS_SC: 20/25
METHOD: SNELLEN - LINEAR

## 2019-06-28 ASSESSMENT — EXTERNAL EXAM - LEFT EYE: OS_EXAM: NORMAL

## 2019-06-28 ASSESSMENT — TONOMETRY
IOP_METHOD: APPLANATION
OS_IOP_MMHG: 18
OD_IOP_MMHG: 17

## 2019-06-28 ASSESSMENT — REFRACTION_MANIFEST
OD_AXIS: 135
OD_ADD: +2.50
OS_SPHERE: -0.50
OD_SPHERE: -0.50
OS_ADD: +2.50
OS_CYLINDER: SPHERE
OD_CYLINDER: +0.25

## 2019-06-28 ASSESSMENT — SLIT LAMP EXAM - LIDS
COMMENTS: NORMAL
COMMENTS: NORMAL

## 2019-06-28 ASSESSMENT — EXTERNAL EXAM - RIGHT EYE: OD_EXAM: NORMAL

## 2019-06-28 NOTE — NURSING NOTE
Chief Complaints and History of Present Illnesses   Patient presents with     Post Op (Ophthalmology) Both Eyes     Chief Complaint(s) and History of Present Illness(es)     Post Op (Ophthalmology) Both Eyes     Laterality: both eyes    Associated symptoms: Negative for eye pain, redness, dryness and tearing    Pain scale: 0/10              Comments     Patient returns to clinic for post operative cataract extraction with IOLs in both eyes, right eye 5/29/19 and left eye 05/08/19.  He states that he can not believe how well he see without glasses.      He has finished his post operative drops but continues to take his glaucoma medications.    Ailyn Gardiner COT 2:30 PM June 28, 2019

## 2019-06-28 NOTE — PATIENT INSTRUCTIONS
Eye Drop Instructions:    Both eyes:  1. Latanoprost (green top) - 1 drop at bedtime to both eyes.  2. Timolol (yellow top) - 1 drop in the morning in both eyes.     If you have any worsening eye pain, redness, flashes/floaters, or decreased vision, please call the Eye Clinic at [phone].

## 2019-06-28 NOTE — PROGRESS NOTES
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     Seen by Dr. Ritesh Medrano O.D. 2/2019 while living with sister in [place] for the past 2 years. Told he had glaucoma and cataracts.  Office #: [phone]  Fax #: [phone]    Currently back living in [place]. Noticing that he has some blurry vision, intermittent in both eyes. Current glasses over 3 years old. Lower part of the vision \"is not doing well\" in both eyes over the past 6 months.      Interval: Here for POM1 visit s/p Ce/IOL right eye. Doing well, no pain. Happy with vision.     POH:  Last eye exam: 6/2016 - Dr. Velazquez at [place]    Prior eye surgery/laser: none  CTL wearer: 2 week disposables; does not sleep in lenses  Glasses: bifocals    Fam hx of eye disease: mother- glaucoma    -hx of OHTN left eye after trauma - reports multiple episodes of head trauma and black eyes as a teenager and young adult    --> s/p CE/IOL left eye 5/8/19  --> s/p CE/IOL right eye 5/29/19    (per prior records from [facility]):     -Tmax 29     -previous HVF's \"normal\"     -mother and maybe grandmother with +hx of glaucoma     -gonio (2012) - no angle recession     -pachy: 596 left eye     Glaucoma work up/testing:   -C:D (DFE 3/15/19): 0.3 right eye, 0.6 left eye    -Tmax (on lumigan/latanoprost): 20, 21   -pachy: 592/601   -positive fam hx of glaucoma   -gonio open each eye   -HVF 24-2 (4/20/19): 1st test, few non-specific defects   -OCT nerve (3/15/19): right eye: G360, left eye: inf thinning, otherwise green    Gtts:  Latanoprost at bedtime OU  Timolol qAM each eye     All:  NKDA    A/P:  1. Pseudophakia, right eye   -s/p CE/IOL 5/29/19  -looks great off gtts  -monitor    2. Pseudophakia, left eye  -s/p CE/IOL 5/8/19  -looks great off gtts  -monitor    3. Glaucoma suspect, each eye  -IOP improved 17/18 on timolol qAM and latanoprost at bedtime each " eye  -refilled both today    -diagnosed as ocular hypertension left eye 2/2 prior head trauma by hx; also with C:D asymmetry (0.4 vs. 0.6)  -OCT nerve (3/15/19) - shows inferior thinning left eye; normal right eye  -Tmax per prior records 29 off gtts left eye  -pachy thick each eye  -positive fam hx   -HVF 24-2 (4/20/19): initial test, few non-specific defects; monitor  -gonio (4/20/19): open to CBB each eye 360 with few inf PAS left eye; no angle recession; can visualize peripheral iris vessels, but no heme/hyphema/branching vessels of concern; likely visualized due to light iris color -appear symmetric each eye.    4. Hypertension  -mild attenuation of retinal vessels each eye with no CWS, heme   -recommend tight BP control  -f/u with PCP, med compliance reviewed  -annual DFE    5. Peripheral lattice, left eye  -no RT/RD, likely underlying myopia  -reviewed return precautions   -monitor    F/u 6 months - IOP check    Lynnette Buchanan MD  PGY-5, Cornea Fellow  Ophthalmology    Complete documentation of historical and exam elements from today's encounter can be found in the full encounter summary report (not reduplicated in this progress note). I personally obtained the chief complaint(s) and history of present illness.  I confirmed and edited as necessary the review of systems, past medical/surgical history, family history, social history, and examination findings as documented by others; and I examined the patient myself. I personally reviewed the relevant tests, images, and reports as documented above. I formulated and edited as necessary the assessment and plan and discussed the findings and management plan with the patient and family. I was present for all procedures performed during this visit.    Lynnette Buchanan MD  Cornea Fellow

## 2019-08-07 ENCOUNTER — OFFICE VISIT (OUTPATIENT)
Dept: ORTHOPEDICS | Facility: CLINIC | Age: 59
End: 2019-08-07
Payer: COMMERCIAL

## 2019-08-07 ENCOUNTER — ANCILLARY PROCEDURE (OUTPATIENT)
Dept: GENERAL RADIOLOGY | Facility: CLINIC | Age: 59
End: 2019-08-07
Attending: FAMILY MEDICINE
Payer: COMMERCIAL

## 2019-08-07 VITALS — BODY MASS INDEX: 27.48 KG/M2 | WEIGHT: 171 LBS | HEIGHT: 66 IN

## 2019-08-07 DIAGNOSIS — M79.672 LEFT FOOT PAIN: Primary | ICD-10-CM

## 2019-08-07 ASSESSMENT — MIFFLIN-ST. JEOR: SCORE: 1543.4

## 2019-08-07 NOTE — PROGRESS NOTES
CHIEF COMPLAINT:  Pain of the Left Foot       HISTORY OF PRESENT ILLNESS  Mr. Houston is a pleasant 59 year old year old male who presents to clinic today with left foot pain.  Stas explains that he was walking down a spiral staircase 3 days ago when he missed a step.  His foot landed on the step and may have twisted.  Pain in mid and forefoot after this time.  He has noted pain is worse with walking, today had burning pain walking from bus stop.  Improved with rest and elevation.  Greatest area of pain at 1st metatarsal plantar surface.  No associated bruising or swelling of foot.      Works at [employer] and concerned he will not be able to walk/perform duties and would like time off if appropriate.    Previous similar pain: No  Treatments to date: Rest     Additional history: as documented    MEDICAL HISTORY  Patient Active Problem List   Diagnosis     Acid reflux     Altered mental status, unspecified altered mental status type     Anxiety disorder     Avoidant personality disorder (H)     Choroidal nevus     Essential hypertension     Glaucoma suspect, left eye     Hepatitis C     Astigmatism     Major depressive disorder, recurrent episode, mild (H)     Mild major depression (H)     Retinal lattice degeneration     Cervical pain       Current Outpatient Medications   Medication Sig Dispense Refill     amLODIPine (NORVASC) 10 MG tablet TAKE 1 TABLET BY MOUTH DAILY IN THE MORNING       atorvastatin (LIPITOR) 20 MG tablet Take 20 mg by mouth every evening        gabapentin (NEURONTIN) 300 MG capsule 300 MG TABLET TWICE DAILY       ketorolac (ACULAR) 0.5 % ophthalmic solution Place 1 drop into the right eye 4 times daily 1 Bottle 0     ketorolac (ACULAR) 0.5 % ophthalmic solution Place 1 drop Into the left eye 4 times daily 1 Bottle 0     latanoprost (XALATAN) 0.005 % ophthalmic solution Place 1 drop into both eyes At Bedtime 1 Bottle 11     ofloxacin (OCUFLOX) 0.3 % ophthalmic solution Place 1 drop into the  "right eye 4 times daily 1 Bottle 0     prednisoLONE acetate (PRED FORTE) 1 % ophthalmic suspension Place 1 drop into the right eye 4 times daily 1 Bottle 0     prednisoLONE acetate (PRED FORTE) 1 % ophthalmic suspension Place 1 drop Into the left eye 4 times daily 1 Bottle 0     prednisoLONE acetate (PRED FORTE) 1 % ophthalmic suspension Place 1 drop Into the left eye 4 times daily 5 mL 0     timolol maleate (TIMOPTIC) 0.5 % ophthalmic solution Place 1 drop into both eyes daily 1 Bottle 11       No Known Allergies    Family History   Problem Relation Age of Onset     Glaucoma Mother      Diabetes No family hx of      Macular Degeneration No family hx of        Additional medical/Social/Surgical histories reviewed in [facility] and updated as appropriate.     REVIEW OF SYSTEMS (8/7/2019)  A 10-point review of systems was obtained and is negative except for as noted in the HPI.      PHYSICAL EXAM  Ht 1.676 m (5' 6\")   Wt 77.6 kg (171 lb)   BMI 27.60 kg/m      General  - normal appearance, in no obvious distress  CV  - normal pulses at posterior tib and dorsalis pedis  Pulm  - normal respiratory pattern, non-labored  Musculoskeletal -left foot  - stance: Slow, antalgic gait favoring left foot, difficulty with toe off phase and walks with flat foot.  - inspection: no swelling or effusion,  normal bone and joint alignment, no obvious deformity  - palpation: Tenderness at midfoot near naviculocuboid, cuneiform-cuboid regions dorsally at foot.    - ROM: normal active and passive ROM of great and lesser toes, pain with MT translation of first digit at junction of tarsometatarsal region. Full ankle ROM painless.  - strength: 5/5 in all planes  Neuro  - no sensory or motor deficit, grossly normal coordination, normal muscle tone  Skin  - no ecchymosis, erythema, warmth, or induration, no obvious rash  Psych  - interactive, appropriate, normal mood and affect    IMAGING : XR left foot 3V. Final results and radiologist's " interpretation, available in the [facility] health record. Images were reviewed with the patient/family members in the office today. My personal interpretation of the performed imaging is no acute abnormality of foot. No lisfranc widening.     ASSESSMENT & PLAN  Mr. Houston is a 59 year old year old male who presents to clinic today with acute pain of left foot suffered after missing a step negotiating a flight of spiral stairs x 3 days ago. Suspect midfoot sprain of foot.    Diagnosis: Acute pain of left foot    -offered cam boot, declined  -Wrap foot with ace up to ankle  -Rest, ice  -Aleve BID x 5 days  -Work note; off until Saturday 8/10/19  -Follow up if persisting in 7-10 days; consider cam boot.    It was a pleasure seeing Stas today.    Neto Gibbons DO, CAM  Primary Care Sports Medicine

## 2019-08-07 NOTE — PROGRESS NOTES
SPORTS & ORTHOPEDIC WALK-IN VISIT 8/7/2019    Primary Care Physician: Dr. Munson    Reason for visit:     What part of your body is injured / painful?  left foot    What caused the injury /pain? Stepped wrong coming off the stairs     How long ago did your injury occur or pain begin? Sunday 8/4/19    What are your most bothersome symptoms? Pain    How would you characterize your symptom?  throbing and burning    What makes your symptoms better? Rest    What makes your symptoms worse? Walking    Have you been previously seen for this problem? No    Medical History:    Any recent changes to your medical history? No    Any new medication prescribed since last visit? No    Have you had surgery on this body part before? No    Social History:    Occupation:     Handedness: Right    Exercise: None    Review of Systems:    Do you have fever, chills, weight loss? No    Do you have any vision problems? No    Do you have any chest pain or edema? No    Do you have any shortness of breath or wheezing?  No    Do you have stomach problems? No    Do you have any numbness or focal weakness? No    Do you have diabetes? No    Do you have problems with bleeding or clotting? No    Do you have an rashes or other skin lesions? No

## 2019-08-07 NOTE — LETTER
August 7, 2019      Stas Houston  [address] [address]        To Whom It May Concern:    Stas Houston was seen in our clinic and has suffered a midfoot injury. He can return to work on 8/10/19. Thank you for understanding his needs during this time.       Sincerely,        Neto Gibbons, DO

## 2019-08-07 NOTE — LETTER
8/7/2019       RE: Stas Houston  [address] [address]     Dear Colleague,    Thank you for referring your patient, Stas Houston, to the [facility] SPORTS AND ORTHOPAEDIC WALK IN CLINIC at [facility]. Please see a copy of my visit note below.          SPORTS & ORTHOPEDIC WALK-IN VISIT 8/7/2019    Primary Care Physician: Dr. Munson    Reason for visit:     What part of your body is injured / painful?  left foot    What caused the injury /pain? Stepped wrong coming off the stairs     How long ago did your injury occur or pain begin? Sunday 8/4/19    What are your most bothersome symptoms? Pain    How would you characterize your symptom?  throbing and burning    What makes your symptoms better? Rest    What makes your symptoms worse? Walking    Have you been previously seen for this problem? No    Medical History:    Any recent changes to your medical history? No    Any new medication prescribed since last visit? No    Have you had surgery on this body part before? No    Social History:    Occupation:     Handedness: Right    Exercise: None    Review of Systems:    Do you have fever, chills, weight loss? No    Do you have any vision problems? No    Do you have any chest pain or edema? No    Do you have any shortness of breath or wheezing?  No    Do you have stomach problems? No    Do you have any numbness or focal weakness? No    Do you have diabetes? No    Do you have problems with bleeding or clotting? No    Do you have an rashes or other skin lesions? No           CHIEF COMPLAINT:  Pain of the Left Foot       HISTORY OF PRESENT ILLNESS  Mr. Houston is a pleasant 59 year old year old male who presents to clinic today with left foot pain.  Stas explains that he was walking down a spiral staircase 3 days ago when he missed a step.  His foot landed on the step and may have twisted.  Pain in mid and forefoot after this time.  He has noted pain is worse with  walking, today had burning pain walking from bus stop.  Improved with rest and elevation.  Greatest area of pain at 1st metatarsal plantar surface.  No associated bruising or swelling of foot.      Works at the[employer] and concerned he will not be able to walk/perform duties and would like time off if appropriate.    Previous similar pain: No  Treatments to date: Rest     Additional history: as documented    MEDICAL HISTORY  Patient Active Problem List   Diagnosis     Acid reflux     Altered mental status, unspecified altered mental status type     Anxiety disorder     Avoidant personality disorder (H)     Choroidal nevus     Essential hypertension     Glaucoma suspect, left eye     Hepatitis C     Astigmatism     Major depressive disorder, recurrent episode, mild (H)     Mild major depression (H)     Retinal lattice degeneration     Cervical pain       Current Outpatient Medications   Medication Sig Dispense Refill     amLODIPine (NORVASC) 10 MG tablet TAKE 1 TABLET BY MOUTH DAILY IN THE MORNING       atorvastatin (LIPITOR) 20 MG tablet Take 20 mg by mouth every evening        gabapentin (NEURONTIN) 300 MG capsule 300 MG TABLET TWICE DAILY       ketorolac (ACULAR) 0.5 % ophthalmic solution Place 1 drop into the right eye 4 times daily 1 Bottle 0     ketorolac (ACULAR) 0.5 % ophthalmic solution Place 1 drop Into the left eye 4 times daily 1 Bottle 0     latanoprost (XALATAN) 0.005 % ophthalmic solution Place 1 drop into both eyes At Bedtime 1 Bottle 11     ofloxacin (OCUFLOX) 0.3 % ophthalmic solution Place 1 drop into the right eye 4 times daily 1 Bottle 0     prednisoLONE acetate (PRED FORTE) 1 % ophthalmic suspension Place 1 drop into the right eye 4 times daily 1 Bottle 0     prednisoLONE acetate (PRED FORTE) 1 % ophthalmic suspension Place 1 drop Into the left eye 4 times daily 1 Bottle 0     prednisoLONE acetate (PRED FORTE) 1 % ophthalmic suspension Place 1 drop Into the left eye 4 times daily 5 mL 0      "timolol maleate (TIMOPTIC) 0.5 % ophthalmic solution Place 1 drop into both eyes daily 1 Bottle 11       No Known Allergies    Family History   Problem Relation Age of Onset     Glaucoma Mother      Diabetes No family hx of      Macular Degeneration No family hx of        Additional medical/Social/Surgical histories reviewed in [facility] and updated as appropriate.     REVIEW OF SYSTEMS (8/7/2019)  A 10-point review of systems was obtained and is negative except for as noted in the HPI.      PHYSICAL EXAM  Ht 1.676 m (5' 6\")   Wt 77.6 kg (171 lb)   BMI 27.60 kg/m       General  - normal appearance, in no obvious distress  CV  - normal pulses at posterior tib and dorsalis pedis  Pulm  - normal respiratory pattern, non-labored  Musculoskeletal -left foot  - stance: Slow, antalgic gait favoring left foot, difficulty with toe off phase and walks with flat foot.  - inspection: no swelling or effusion,  normal bone and joint alignment, no obvious deformity  - palpation: Tenderness at midfoot near naviculocuboid, cuneiform-cuboid regions dorsally at foot.    - ROM: normal active and passive ROM of great and lesser toes, pain with MT translation of first digit at junction of tarsometatarsal region. Full ankle ROM painless.  - strength: 5/5 in all planes  Neuro  - no sensory or motor deficit, grossly normal coordination, normal muscle tone  Skin  - no ecchymosis, erythema, warmth, or induration, no obvious rash  Psych  - interactive, appropriate, normal mood and affect    IMAGING : XR left foot 3V. Final results and radiologist's interpretation, available in the [facility] health record. Images were reviewed with the patient/family members in the office today. My personal interpretation of the performed imaging is no acute abnormality of foot. No lisfranc widening.     ASSESSMENT & PLAN  Mr. Houston is a 59 year old year old male who presents to clinic today with acute pain of left foot suffered after missing a step negotiating a " flight of spiral stairs x 3 days ago. Suspect midfoot sprain of foot.    Diagnosis: Acute pain of left foot    -offered cam boot, declined  -Wrap foot with ace up to ankle  -Rest, ice  -Aleve BID x 5 days  -Work note; off until Saturday 8/10/19  -Follow up if persisting in 7-10 days; consider cam boot.    It was a pleasure seeing Stas today.    Neto Gibbons, , CAQSM  Primary Care Sports Medicine

## 2019-08-29 ENCOUNTER — OFFICE VISIT (OUTPATIENT)
Dept: ORTHOPEDICS | Facility: CLINIC | Age: 59
End: 2019-08-29
Payer: COMMERCIAL

## 2019-08-29 VITALS — HEIGHT: 67 IN | WEIGHT: 171 LBS | BODY MASS INDEX: 26.84 KG/M2

## 2019-08-29 DIAGNOSIS — M54.12 CERVICAL RADICULOPATHY: Primary | ICD-10-CM

## 2019-08-29 RX ORDER — PREDNISONE 20 MG/1
40 TABLET ORAL DAILY
Qty: 10 TABLET | Refills: 0 | Status: SHIPPED | OUTPATIENT
Start: 2019-08-29 | End: 2022-08-10

## 2019-08-29 ASSESSMENT — MIFFLIN-ST. JEOR: SCORE: 1559.28

## 2019-08-29 NOTE — PROGRESS NOTES
"      SPORTS & ORTHOPEDIC WALK-IN FOLLOW-UP VISIT 8/29/2019    Interval History:     Follow up reason: Neck pain     Date of injury: NA     Date last seen: 5/20/19    Following Therapeutic Plan: Yes     Pain: Unchanged    Function: Unchanged    Interval History: Here today for recurrence of shoulder and neck pain. Has undergone two epidural steroid injections. First injection resulted in complete improvement for only a few days. Second injection improved symptoms for two months but sx have returned over past month. Current symptoms similar to previous. Localizes to neck and upper arm on the left. Has pain at night. No weakness.     Medical History:    Any recent changes to your medical history? No    Any new medication prescribed since last visit? No    Review of Systems:    Do you have fever, chills, weight loss? No    Do you have any vision problems? No    Do you have any chest pain or edema? No    Do you have any shortness of breath or wheezing?  No    Do you have stomach problems? No    Do you have any numbness or focal weakness? No    Do you have diabetes? No    Do you have problems with bleeding or clotting? No    Do you have an rashes or other skin lesions? No         Past Medical History, Current Medications, and Allergies are reviewed in the electronic medical record as appropriate.       EXAM:Ht 1.702 m (5' 7\")   Wt 77.6 kg (171 lb)   BMI 26.78 kg/m      General: alert, pleasant, no distress  Neck/Spine: no TTP of spinous processes in cervical spine. ROM with flexion, extension, rotation, tilting  with pain. negative TTP along paraspinous muscles and upper trapezius musculature. negative Periscapular pain.  negative tightness in this area. No noted bruising or skin discoloration. Spurlings positive   Neurologic: SILT throughout upper extremities. Strength 5/5 and symmetric throughout upper extremities.   Upper Extremities: warm, well perfused, no edema. Full ROM without pain in shoulder, elbow, wrist, " and hand. Good capillary refill bilaterally      Imaging: no new imaging this visit      Assessment: Patient is a 59 year old male with chronic neck pain and left upper extremity pain from cervical radiculopathy. Improved after RENU though relief has been transient.     Recommendations:   Discussed options for treatment at this time  Plan to treat with prednisone burst for acute symptoms  Discussed repeat RENU vs. Spine referral and patient would like referral  Assisted with scheduling with spine specialist.   Follow up prn.     Balaji Asencio MD

## 2019-08-29 NOTE — Clinical Note
8/29/2019       RE: Stas Houston  [address] [address]     Dear Colleague,    Thank you for referring your patient, Stas Houston, to the [facility] SPORTS [facility]THOPAEDIC [facility]ALK [facility] Please see a copy of my visit note below.          SPORTS & ORTHOPEDIC WALK-IN FOLLOW-UP VISIT 8/29/2019    Interval History:     Follow up reason: Neck pain     Date of injury: NA     Date last seen: 5/20/19    Following Therapeutic Plan: Yes     Pain: Unchanged    Function: Unchanged    Interval History: First injection didn't work, second injection did only for 2 months and now is looking for another one.     Medical History:    Any recent changes to your medical history? No    Any new medication prescribed since last visit? No    Review of Systems:    Do you have fever, chills, weight loss? No    Do you have any vision problems? No    Do you have any chest pain or edema? No    Do you have any shortness of breath or wheezing?  No    Do you have stomach problems? No    Do you have any numbness or focal weakness? No    Do you have diabetes? No    Do you have problems with bleeding or clotting? No    Do you have an rashes or other skin lesions? No             Again, thank you for allowing me to participate in the care of your patient.      Sincerely,    Balaji Asencio MD

## 2019-08-30 DIAGNOSIS — M54.2 CERVICAL PAIN: Primary | ICD-10-CM

## 2019-09-03 ENCOUNTER — OFFICE VISIT (OUTPATIENT)
Dept: ORTHOPEDICS | Facility: CLINIC | Age: 59
End: 2019-09-03
Payer: COMMERCIAL

## 2019-09-03 ENCOUNTER — ANCILLARY PROCEDURE (OUTPATIENT)
Dept: GENERAL RADIOLOGY | Facility: CLINIC | Age: 59
End: 2019-09-03
Attending: ORTHOPAEDIC SURGERY
Payer: COMMERCIAL

## 2019-09-03 VITALS — HEIGHT: 67 IN | BODY MASS INDEX: 26.84 KG/M2 | WEIGHT: 171 LBS

## 2019-09-03 DIAGNOSIS — M54.12 CERVICAL RADICULOPATHY: Primary | ICD-10-CM

## 2019-09-03 ASSESSMENT — MIFFLIN-ST. JEOR: SCORE: 1559.28

## 2019-09-03 NOTE — PROGRESS NOTES
Spine Surgery Consultation    REFERRING PHYSICIAN: Balaji Asencio   PRIMARY CARE PHYSICIAN: Veronica Munson         Chief Complaint:   Consult (Cervical radiculopathy)      History of Present Illness:  Symptom Profile Including: location of symptoms, onset, severity, exacerbating/alleviating factors, previous treatments:        Stas is a 59-year-old gentleman here for evaluation of neck and bilateral left greater than right arm pain in a C5 and C6 distribution radiating into the deltoids and anterior arm.  Reports the pain has been present since about 2004.  No traumatic injury at that time.  Wakes up with significant neck pain, improved over the course the day, however it is quite painful at night.  He reports 80% of his symptoms are in the left upper arm 20% in the neck, worse into the biceps, like a shooting pain.  Minimal right-sided symptoms.  No previous neck surgeries.    Has had 2 epidural steroid injections, the first 1 improved symptoms 100% for 2 days, the second improved symptoms for approximately 2 months.  He denies any issues with his dexterity.  No changes in his handwriting.  No issues with his balance.  He takes gabapentin for pain control.  Does not use any narcotics.  He works as a .  He lives in a rehab center.    Has some one physical therapy session.         Past Medical History:     Past Medical History:   Diagnosis Date     Depressive disorder      Glaucoma suspect      Hypertension      OHT (ocular hypertension)             Past Surgical History:     Past Surgical History:   Procedure Laterality Date     ORTHOPEDIC SURGERY  2017    Left Shoulder Surgery     PHACOEMULSIFICATION WITH STANDARD INTRAOCULAR LENS IMPLANT Left 5/8/2019    Procedure: Left Eye Phacoemulsification with Standard Intraocular Lens;  Surgeon: Lynnette Buchanan MD;  Location:  OR     PHACOEMULSIFICATION WITH STANDARD INTRAOCULAR LENS IMPLANT Right 05/29/2019     PHACOEMULSIFICATION WITH STANDARD  "INTRAOCULAR LENS IMPLANT Right 5/29/2019    Procedure: Right Eye Phacoemulsification with Standard Intraocular Lens;  Surgeon: Lynnette Buchanan MD;  Location:  OR            Social History:     Social History     Tobacco Use     Smoking status: Current Every Day Smoker     Packs/day: 1.00     Types: Cigarettes     Smokeless tobacco: Never Used   Substance Use Topics     Alcohol use: Never     Frequency: Never   Daily smoker, 1 pack per day  [place] at the [employer] [place] [place]         Family History:     Family History   Problem Relation Age of Onset     Glaucoma Mother      Diabetes No family hx of      Macular Degeneration No family hx of             Allergies:   No Known Allergies         Medications:     Current Outpatient Medications   Medication     amLODIPine (NORVASC) 10 MG tablet     atorvastatin (LIPITOR) 20 MG tablet     gabapentin (NEURONTIN) 300 MG capsule     ketorolac (ACULAR) 0.5 % ophthalmic solution     ketorolac (ACULAR) 0.5 % ophthalmic solution     latanoprost (XALATAN) 0.005 % ophthalmic solution     ofloxacin (OCUFLOX) 0.3 % ophthalmic solution     prednisoLONE acetate (PRED FORTE) 1 % ophthalmic suspension     prednisoLONE acetate (PRED FORTE) 1 % ophthalmic suspension     prednisoLONE acetate (PRED FORTE) 1 % ophthalmic suspension     predniSONE (DELTASONE) 20 MG tablet     timolol maleate (TIMOPTIC) 0.5 % ophthalmic solution     No current facility-administered medications for this visit.              Review of Systems:     A 10 point ROS was performed and reviewed. Specific responses to these questions are noted at the end of the document.         Physical Exam:   Vitals: Ht 1.702 m (5' 7\")   Wt 77.6 kg (171 lb)   BMI 26.78 kg/m    Constitutional: awake, alert, cooperative, no apparent distress, appears stated age.    Eyes: The sclera are white.  Ears, Nose, Throat: The trachea is midline.  Psychiatric: The patient has a normal affect.  Respiratory: breathing " non-labored  Cardiovascular: The extremities are warm and perfused.  Skin: no obvious rashes or lesions.  Musculoskeletal, Neurologic, and Spine:     Cervical spine:    Appearance -No gross step-offs, kyphosis.  Palpation - Tender to palpation along SPs  ROM- Flex/Extend rotation with neck pain, but no increased radicular pain    Motor -     C5: Deltoids R 5/5 and L 5/5 strength    C6: Biceps R 5/5 and L 5/5 strength     C7: Triceps R 5/5 and L 5/5 strength     C8:  R 5/5 and L 5/5 strength     T1: Dorsal interossei R 4/5 and L 4/5 strength        Sensation: Intact to light touch in C5-T1      Special Tests -      Lhermitte's Test - Negative     Spurling's Test - Positive for bilateral increased C5/C6 dermatomal pain     Bearden's Test - Negative    Thoracic Spine:                          Appearance - Normal sagitall alignment                          Palpation - Non-tender to palpation    Lumbar Spine:    Appearance - No gross stepoffs or deformities    Palpation- Non-tender to palpation  ROM- Flex/Extend rotation without pain    Motor -     L2-3: Hip flexion 5/5 R and 5/5 L strength          L3/4:  Knee extension R 5/5 and L 5/5 strength         L4/5:  Foot dorsiflexion R 5/5 L 5/5 and       EHL dorsiflexion R 4/5 L 4/5 strength         S1:  Plantarflexion/Peroneal Muscles  R 5/5 and L 5/5 strength    Sensation: Intact to light touch L3-S1 distribution BLE      Neurologic:      REFLEXES Left Right   Biceps 1+ 1+   Triceps 1+ 1+   Brachioradialis 1+ 1+   Patella 1+ 1+   Ankle jerk 1+ 1+   Babinski No upgoing great toe No upgoing great toe   Clonus 0 beats 0 beats     Straight leg raise: (-) on the bilateral legs    Shoulder Exam:  Full painless ROM.  5/5 strength with IR/ER.  (-) Chichi, (-) Kemper    Alignment:  Patient stands with a neutral standing sagittal balance.           Imaging:   We ordered and independently reviewed new radiographs at this clinic visit. The results were discussed with the patient.   Findings include:    September 3, 2019 cervical AP lateral flexion-extension radiographs show severe multilevel degenerative changes with a grade 1 spondylolisthesis C5-6 and severe degenerative changes particularly C6-7 with large anterior osteophyte formation.  The patient is a dentulous.      Cervical MRI April 2, 2019 again shows severe multilevel degenerative changes with grade 1 spondylolisthesis C5-6 with moderate to severe resultant bilateral and foraminal as well as central stenosis moderate stenosis C3-4 and C4-5 due to facet hypertrophy and ligamentum flavum hypertrophy             Assessment and Plan:     59-year-old gentleman with multilevel spondylolysis and spondylolisthesis most significant at C3-C4, C4-C5, C5-C6 with left greater than right C5 and C6 radiculopathy without any signs of myelopathy.        Had a nice long discussion about treatment options.  Discussed both surgical and nonsurgical treatment.  The surgical treatment would be in  form of a 3 level ACDF fusion.  Would use iliac crest bone graft for this.  Stas would need to quit smoking prior to proceeding with this surgery.  He reports that his symptoms are severe enough that he would consider surgery.  We will have him meet with his primary care physician for smoking cessation, recommended trying Chantix.  Also provided information on spineRhytec for the planned procedure.  He will also have an updated physical therapy referral which he plans to pursue this fall once his work schedule lightens.  He will contact us once he is successfully quit smoking and is interested in proceeding with surgery.      Patient was seen and discussed with Dr. Cobb    Attending MD (Dr. Lisandro Cobb) :  I reviewed and verified the history and physical exam of the patient and discussed the patient's management with the other clinical providers involved in this patient's care including any involved residents or physicians assistants. I reviewed  the above note and agree with the documented findings and plan of care, which were communicated to the patient.      MD Kraig Chua MD  Orthopedic Surgery, PGY-4

## 2019-09-03 NOTE — NURSING NOTE
"Reason For Visit:   Chief Complaint   Patient presents with     Consult     Cervical radiculopathy       Primary MD: Veronica Munson      ?  No  Occupation: Janitorial work.  Currently working? Yes.  Work status?  Part-time.  Date of injury: 2004  Type of injury: Fall  Date of surgery: No  Type of surgery:N/A.  Smoker: Yes  Request smoking cessation information: No    Ht 1.702 m (5' 7\")   Wt 77.6 kg (171 lb)   BMI 26.78 kg/m      Pain Assessment  Patient Currently in Pain: Yes  0-10 Pain Scale: 5  Primary Pain Location: (Cervical region)    Oswestry (ANN-MARIE) Questionnaire    No flowsheet data found.         Neck Disability Index (NDI) Questionnaire    No flowsheet data found.                Promis 10 Assessment    No flowsheet data found.             Lizzie Brand ATC    "

## 2019-09-03 NOTE — LETTER
9/3/2019       RE: Stas Houston  [address] [id number]     Dear Colleague,    Thank you for referring your patient, Stas Houston, to the [facility]. Please see a copy of my visit note below.    Spine Surgery Consultation    REFERRING PHYSICIAN: Balaji Asencio   PRIMARY CARE PHYSICIAN: Veronica Munson         Chief Complaint:   Consult (Cervical radiculopathy)      History of Present Illness:  Symptom Profile Including: location of symptoms, onset, severity, exacerbating/alleviating factors, previous treatments:        Stas is a 59-year-old gentleman here for evaluation of neck and bilateral left greater than right arm pain in a C5 and C6 distribution radiating into the deltoids and anterior arm.  Reports the pain has been present since about 2004.  No traumatic injury at that time.  Wakes up with significant neck pain, improved over the course the day, however it is quite painful at night.  He reports 80% of his symptoms are in the left upper arm 20% in the neck, worse into the biceps, like a shooting pain.  Minimal right-sided symptoms.  No previous neck surgeries.    Has had 2 epidural steroid injections, the first 1 improved symptoms 100% for 2 days, the second improved symptoms for approximately 2 months.  He denies any issues with his dexterity.  No changes in his handwriting.  No issues with his balance.  He takes gabapentin for pain control.  Does not use any narcotics.  He works as a .  He lives in a rehab center.    Has some one physical therapy session.         Past Medical History:     Past Medical History:   Diagnosis Date     Depressive disorder      Glaucoma suspect      Hypertension      OHT (ocular hypertension)             Past Surgical History:     Past Surgical History:   Procedure Laterality Date     ORTHOPEDIC SURGERY  2017    Left Shoulder Surgery     PHACOEMULSIFICATION WITH STANDARD  INTRAOCULAR LENS IMPLANT Left 5/8/2019    Procedure: Left Eye Phacoemulsification with Standard Intraocular Lens;  Surgeon: Lynnette Buchanan MD;  Location: [facility] OR     PHACOEMULSIFICATION WITH STANDARD INTRAOCULAR LENS IMPLANT Right 05/29/2019     PHACOEMULSIFICATION WITH STANDARD INTRAOCULAR LENS IMPLANT Right 5/29/2019    Procedure: Right Eye Phacoemulsification with Standard Intraocular Lens;  Surgeon: Lynnette Buchanan MD;  Location:  OR            Social History:     Social History     Tobacco Use     Smoking status: Current Every Day Smoker     Packs/day: 1.00     Types: Cigarettes     Smokeless tobacco: Never Used   Substance Use Topics     Alcohol use: Never     Frequency: Never   Daily smoker, 1 pack per day  [place] at [employer] [place] [employer]         Family History:     Family History   Problem Relation Age of Onset     Glaucoma Mother      Diabetes No family hx of      Macular Degeneration No family hx of             Allergies:   No Known Allergies         Medications:     Current Outpatient Medications   Medication     amLODIPine (NORVASC) 10 MG tablet     atorvastatin (LIPITOR) 20 MG tablet     gabapentin (NEURONTIN) 300 MG capsule     ketorolac (ACULAR) 0.5 % ophthalmic solution     ketorolac (ACULAR) 0.5 % ophthalmic solution     latanoprost (XALATAN) 0.005 % ophthalmic solution     ofloxacin (OCUFLOX) 0.3 % ophthalmic solution     prednisoLONE acetate (PRED FORTE) 1 % ophthalmic suspension     prednisoLONE acetate (PRED FORTE) 1 % ophthalmic suspension     prednisoLONE acetate (PRED FORTE) 1 % ophthalmic suspension     predniSONE (DELTASONE) 20 MG tablet     timolol maleate (TIMOPTIC) 0.5 % ophthalmic solution     No current facility-administered medications for this visit.              Review of Systems:     A 10 point ROS was performed and reviewed. Specific responses to these questions are noted at the end of the document.         Physical Exam:   Vitals: Ht 1.702 m (5'  "7\")   Wt 77.6 kg (171 lb)   BMI 26.78 kg/m     Constitutional: awake, alert, cooperative, no apparent distress, appears stated age.    Eyes: The sclera are white.  Ears, Nose, Throat: The trachea is midline.  Psychiatric: The patient has a normal affect.  Respiratory: breathing non-labored  Cardiovascular: The extremities are warm and perfused.  Skin: no obvious rashes or lesions.  Musculoskeletal, Neurologic, and Spine:     Cervical spine:    Appearance -No gross step-offs, kyphosis.  Palpation - Tender to palpation along SPs  ROM- Flex/Extend rotation with neck pain, but no increased radicular pain    Motor -     C5: Deltoids R 5/5 and L 5/5 strength    C6: Biceps R 5/5 and L 5/5 strength     C7: Triceps R 5/5 and L 5/5 strength     C8:  R 5/5 and L 5/5 strength     T1: Dorsal interossei R 4/5 and L 4/5 strength        Sensation: Intact to light touch in C5-T1      Special Tests -      Lhermitte's Test - Negative     Spurling's Test - Positive for bilateral increased C5/C6 dermatomal pain     Bearden's Test - Negative    Thoracic Spine:                          Appearance - Normal sagitall alignment                          Palpation - Non-tender to palpation    Lumbar Spine:    Appearance - No gross stepoffs or deformities    Palpation- Non-tender to palpation  ROM- Flex/Extend rotation without pain    Motor -     L2-3: Hip flexion 5/5 R and 5/5 L strength          L3/4:  Knee extension R 5/5 and L 5/5 strength         L4/5:  Foot dorsiflexion R 5/5 L 5/5 and       EHL dorsiflexion R 4/5 L 4/5 strength         S1:  Plantarflexion/Peroneal Muscles  R 5/5 and L 5/5 strength    Sensation: Intact to light touch L3-S1 distribution BLE      Neurologic:      REFLEXES Left Right   Biceps 1+ 1+   Triceps 1+ 1+   Brachioradialis 1+ 1+   Patella 1+ 1+   Ankle jerk 1+ 1+   Babinski No upgoing great toe No upgoing great toe   Clonus 0 beats 0 beats     Straight leg raise: (-) on the bilateral legs    Shoulder " Exam:  Full painless ROM.  5/5 strength with IR/ER.  (-) Chichi, (-) Powell    Alignment:  Patient stands with a neutral standing sagittal balance.         Imaging:   We ordered and independently reviewed new radiographs at this clinic visit. The results were discussed with the patient.  Findings include:    September 3, 2019 cervical AP lateral flexion-extension radiographs show severe multilevel degenerative changes with a grade 1 spondylolisthesis C5-6 and severe degenerative changes particularly C6-7 with large anterior osteophyte formation.  The patient is a dentulous.      Cervical MRI April 2, 2019 again shows severe multilevel degenerative changes with grade 1 spondylolisthesis C5-6 with moderate to severe resultant bilateral and foraminal as well as central stenosis moderate stenosis C3-4 and C4-5 due to facet hypertrophy and ligamentum flavum hypertrophy             Assessment and Plan:     59-year-old gentleman with multilevel spondylolysis and spondylolisthesis most significant at C3-C4, C4-C5, C5-C6 with left greater than right C5 and C6 radiculopathy without any signs of myelopathy.      Had a nice long discussion about treatment options.  Discussed both surgical and nonsurgical treatment.  The surgical treatment would be in  form of a 3 level ACDF fusion.  Would use iliac crest bone graft for this.  Stas would need to quit smoking prior to proceeding with this surgery.  He reports that his symptoms are severe enough that he would consider surgery.  We will have him meet with his primary care physician for smoking cessation, recommended trying Chantix.  Also provided information on [facility] for the planned procedure.  He will also have an updated physical therapy referral which he plans to pursue this fall once his work schedule lightens.  He will contact us once he is successfully quit smoking and is interested in proceeding with surgery.    Patient was seen and discussed with   Reza Mondragon MD (Dr. Lisandro Cobb) :  I reviewed and verified the history and physical exam of the patient and discussed the patient's management with the other clinical providers involved in this patient's care including any involved residents or physicians assistants. I reviewed the above note and agree with the documented findings and plan of care, which were communicated to the patient.      MD Kraig Chua MD  Orthopedic Surgery, PGY-4

## 2019-09-03 NOTE — NURSING NOTE
Physical therapy order given. Explained to patient to see PCP to discuss smoking cessation using Chantix. Patient has no further questions.    Maria A Quispe RN

## 2019-09-17 PROBLEM — M54.2 CERVICAL PAIN: Status: RESOLVED | Noted: 2019-04-24 | Resolved: 2019-09-17

## 2019-09-26 ENCOUNTER — TELEPHONE (OUTPATIENT)
Dept: ORTHOPEDICS | Facility: CLINIC | Age: 59
End: 2019-09-26

## 2019-09-26 NOTE — TELEPHONE ENCOUNTER
pt orders faxed per patient request to PCP at [facility] Veronica Quispe RN

## 2019-09-26 NOTE — TELEPHONE ENCOUNTER
[facility] Call Center    Phone Message    May a detailed message be left on voicemail: yes    Reason for Call: Order(s): Other:   Reason for requested: Orders for PT to be sent to Pt's PCP office at [facility] fax number is [phone] Nereida Munson  Date needed: 9/27/19  Provider name: Dr. Cobb      Action Taken: Message routed to:  [facility] ortho

## 2019-10-02 ENCOUNTER — TRANSFERRED RECORDS (OUTPATIENT)
Dept: HEALTH INFORMATION MANAGEMENT | Facility: CLINIC | Age: 59
End: 2019-10-02

## 2020-06-17 DIAGNOSIS — H40.052 BORDERLINE GLAUCOMA OF LEFT EYE WITH OCULAR HYPERTENSION: ICD-10-CM

## 2020-06-17 NOTE — TELEPHONE ENCOUNTER
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 Glaucoma suspect, each eye  -IOP improved 17/18 on timolol qAM and latanoprost at bedtime each eye  -refilled both today    Routing refill request to provider for review/approval because:  Refer to clinic for updated signature.   Not able to sign order due to Lynnette Buchanan not in data base.      Sheela Morales RN  Central Triage Red Flags/Med Refills

## 2020-06-18 RX ORDER — TIMOLOL MALEATE 5 MG/ML
1 SOLUTION/ DROPS OPHTHALMIC DAILY
Qty: 1 BOTTLE | Refills: 5 | Status: SHIPPED | OUTPATIENT
Start: 2020-06-18 | End: 2021-01-20

## 2020-06-25 ENCOUNTER — TELEPHONE (OUTPATIENT)
Dept: OPHTHALMOLOGY | Facility: CLINIC | Age: 60
End: 2020-06-25

## 2020-06-25 DIAGNOSIS — H40.051 BORDERLINE GLAUCOMA OF RIGHT EYE WITH OCULAR HYPERTENSION: ICD-10-CM

## 2020-06-25 RX ORDER — LATANOPROST 50 UG/ML
1 SOLUTION/ DROPS OPHTHALMIC AT BEDTIME
Qty: 1 BOTTLE | Refills: 4 | Status: SHIPPED | OUTPATIENT
Start: 2020-06-25 | End: 2021-01-05

## 2020-06-25 NOTE — TELEPHONE ENCOUNTER
[facility] Call Center    Phone Message    May a detailed message be left on voicemail: yes     Reason for Call: Medication Refill Request    Has the patient contacted the pharmacy for the refill? Yes   Name of medication being requested: latanoprost (XALATAN) 0.005 % ophthalmic solution   Provider who prescribed the medication: DONOVAN Buchanan   Pharmacy:    [address]    Date medication is needed: ASAP/ patient is going to be out soon    Would like as many refills as possible       Action Taken: Message routed to:  [facility] eye     Travel Screening: Not Applicable

## 2021-01-05 DIAGNOSIS — H40.051 BORDERLINE GLAUCOMA OF RIGHT EYE WITH OCULAR HYPERTENSION: ICD-10-CM

## 2021-01-05 RX ORDER — LATANOPROST 50 UG/ML
1 SOLUTION/ DROPS OPHTHALMIC AT BEDTIME
Qty: 2.5 ML | Refills: 0 | Status: SHIPPED | OUTPATIENT
Start: 2021-01-05 | End: 2021-01-05

## 2021-01-05 RX ORDER — LATANOPROST 50 UG/ML
1 SOLUTION/ DROPS OPHTHALMIC AT BEDTIME
Qty: 2.5 ML | Refills: 0 | Status: SHIPPED | OUTPATIENT
Start: 2021-01-05 | End: 2021-01-20

## 2021-01-05 NOTE — TELEPHONE ENCOUNTER
"  latanoprost (XALATAN) 0.005 % ophthalmic solution   Last Written Prescription Date:  6/25/20  Last Fill Quantity: 1 bottle,   # refills: 4  Last Office Visit : 6/28/19  Future Office visit: none    \"  latanoprost at bedtime each eye\"    F/u 6 months - IOP check      "

## 2021-01-05 NOTE — TELEPHONE ENCOUNTER
Pt scheduled January 20th with Dr. Garnett at [facility] location and aware of date/time/location    Jose Manuel Reeder RN 10:11 AM 01/05/21

## 2021-01-19 DIAGNOSIS — H40.051 BORDERLINE GLAUCOMA OF RIGHT EYE WITH OCULAR HYPERTENSION: Primary | ICD-10-CM

## 2021-01-19 DIAGNOSIS — H40.052 BORDERLINE GLAUCOMA OF LEFT EYE WITH OCULAR HYPERTENSION: ICD-10-CM

## 2021-01-20 ENCOUNTER — OFFICE VISIT (OUTPATIENT)
Dept: OPHTHALMOLOGY | Facility: CLINIC | Age: 61
End: 2021-01-20
Attending: OPHTHALMOLOGY
Payer: COMMERCIAL

## 2021-01-20 DIAGNOSIS — H40.051 BORDERLINE GLAUCOMA OF RIGHT EYE WITH OCULAR HYPERTENSION: ICD-10-CM

## 2021-01-20 DIAGNOSIS — H40.052 BORDERLINE GLAUCOMA OF LEFT EYE WITH OCULAR HYPERTENSION: ICD-10-CM

## 2021-01-20 DIAGNOSIS — Z96.1 PSEUDOPHAKIA OF BOTH EYES: Primary | ICD-10-CM

## 2021-01-20 PROCEDURE — 92133 CPTRZD OPH DX IMG PST SGM ON: CPT | Performed by: OPHTHALMOLOGY

## 2021-01-20 PROCEDURE — 92083 EXTENDED VISUAL FIELD XM: CPT | Performed by: OPHTHALMOLOGY

## 2021-01-20 PROCEDURE — 99214 OFFICE O/P EST MOD 30 MIN: CPT | Mod: GC | Performed by: OPHTHALMOLOGY

## 2021-01-20 PROCEDURE — G0463 HOSPITAL OUTPT CLINIC VISIT: HCPCS

## 2021-01-20 RX ORDER — TIMOLOL MALEATE 5 MG/ML
1 SOLUTION/ DROPS OPHTHALMIC DAILY
Qty: 5 ML | Refills: 6 | Status: SHIPPED | OUTPATIENT
Start: 2021-01-20 | End: 2021-12-15

## 2021-01-20 RX ORDER — BUPROPION HYDROCHLORIDE 300 MG/1
TABLET ORAL
COMMUNITY
Start: 2020-10-05

## 2021-01-20 RX ORDER — MULTIVITAMIN/IRON/FOLIC ACID 18MG-0.4MG
TABLET ORAL
COMMUNITY
Start: 2020-12-29

## 2021-01-20 RX ORDER — LATANOPROST 50 UG/ML
1 SOLUTION/ DROPS OPHTHALMIC AT BEDTIME
Qty: 2.5 ML | Refills: 6 | Status: SHIPPED | OUTPATIENT
Start: 2021-01-20 | End: 2021-12-15

## 2021-01-20 RX ORDER — BUPROPION HYDROCHLORIDE 150 MG/1
TABLET ORAL
COMMUNITY
Start: 2020-10-05

## 2021-01-20 RX ORDER — LISINOPRIL 40 MG/1
TABLET ORAL
COMMUNITY
Start: 2020-12-29

## 2021-01-20 RX ORDER — MELOXICAM 15 MG/1
TABLET ORAL
COMMUNITY
Start: 2020-12-29

## 2021-01-20 RX ORDER — TAMSULOSIN HYDROCHLORIDE 0.4 MG/1
CAPSULE ORAL
COMMUNITY
Start: 2020-12-29

## 2021-01-20 ASSESSMENT — VISUAL ACUITY
OS_SC+: -1
OD_SC+: -2
OD_SC: 20/20
METHOD: SNELLEN - LINEAR
OS_SC: 20/25

## 2021-01-20 ASSESSMENT — CONF VISUAL FIELD
METHOD: COUNTING FINGERS
OS_NORMAL: 1
OD_NORMAL: 1

## 2021-01-20 ASSESSMENT — TONOMETRY
OD_IOP_MMHG: 13
IOP_METHOD: APPLANATION (RES)
OD_IOP_MMHG: 16
OS_IOP_MMHG: 20
OS_IOP_MMHG: 17
IOP_METHOD: APPLANATION

## 2021-01-20 ASSESSMENT — EXTERNAL EXAM - LEFT EYE: OS_EXAM: NORMAL

## 2021-01-20 ASSESSMENT — EXTERNAL EXAM - RIGHT EYE: OD_EXAM: NORMAL

## 2021-01-20 ASSESSMENT — CUP TO DISC RATIO
OS_RATIO: 0.6
OD_RATIO: 0.4

## 2021-01-20 ASSESSMENT — SLIT LAMP EXAM - LIDS
COMMENTS: NORMAL
COMMENTS: NORMAL

## 2021-01-20 NOTE — PROGRESS NOTES
Chief Complaint(s) and History of Present Illness(es)     Follow Up     Laterality: both eyes    Course: stable    Associated symptoms: Negative for eye pain, dryness, redness and tearing    Treatments tried: eye drops    Pain scale: 0/10              Comments     He states that his vision has seemed stable in both eyes, since his last   eye exam.       He uses timolol every morning and latanoprost at bedtime each eye.  He   misses a drop dose once or twice a month.    Ailyn Gardiner, COT 1:02 PM  January 20, 2021               Review of systems for the eyes was negative other than the pertinent positives/negatives listed in the HPI.      Assessment & Plan      Stas Houston is a 60 year old male with the following diagnoses:   1. Pseudophakia of both eyes    2. 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 Buchanan ~18 months previous  Reviewed previous notes from Dr. Buchanan, Op reports and previous glaucoma testing  Ordered OCT nerve fiber layer and visual field today     S/p CEIOL, both eyes (2019)  hx of OHTN left eye after trauma - reports multiple episodes of head trauma and black eyes as a teenager and young adult  mother and maybe grandmother with +hx of glaucoma    VF 24-2 (01/20/21):   Right eye: non specific defects, high false negatives  Left eye: non-specific defects vs nasal step    RNFL OCT (01/20/21)   Right eye: normal and stable  Left eye: normal and stable    No evidence of glaucoma changes at this time  Moderate risk given previous intraocular pressure and trauma history   Early posterior capsular opacity (PCO), not visually significant at this time  Medications renewed- timolol every morning and latanoprost at bedtime each eye  Prescription sent  Discussed need for chronic treatment and recommended management in detail     Patient disposition:   Return in about 6 months (around  7/20/2021) for DFE, OCT NFL.    Nicolle Jovel MD  Ophthalmology Resident, PGY-3         Attending Physician Attestation:  Complete documentation of historical and exam elements from today's encounter can be found in the full encounter summary report (not reduplicated in this progress note).  I personally obtained the chief complaint(s) and history of present illness.  I confirmed and edited as necessary the review of systems, past medical/surgical history, family history, social history, and examination findings as documented by others; and I examined the patient myself.  I personally reviewed the relevant tests, images, and reports as documented above.  I formulated and edited as necessary the assessment and plan and discussed the findings and management plan with the patient and family. Attending Physician Image/Tesing Attestation: I personally reviewed the ophthalmic test(s) associated with this encounter, agree with the interpretation(s) as documented by the resident/fellow, and have edited the corresponding report(s) as necessary.  . - Froilan Garnett MD

## 2021-01-20 NOTE — NURSING NOTE
Chief Complaints and History of Present Illnesses   Patient presents with     Follow Up     Chief Complaint(s) and History of Present Illness(es)     Follow Up     Laterality: both eyes    Course: stable    Associated symptoms: Negative for eye pain, dryness, redness and tearing    Treatments tried: eye drops    Pain scale: 0/10              Comments     He states that his vision has seemed stable in both eyes, since his last eye exam.       He uses timolol every morning and latanoprost at bedtime each eye.  He misses a drop dose once or twice a month.    Ailyn Gardiner, COT 1:02 PM  January 20, 2021

## 2021-07-20 DIAGNOSIS — H40.052 BORDERLINE GLAUCOMA OF LEFT EYE WITH OCULAR HYPERTENSION: ICD-10-CM

## 2021-07-20 DIAGNOSIS — H40.051 BORDERLINE GLAUCOMA OF RIGHT EYE WITH OCULAR HYPERTENSION: Primary | ICD-10-CM

## 2021-07-21 ENCOUNTER — OFFICE VISIT (OUTPATIENT)
Dept: OPHTHALMOLOGY | Facility: CLINIC | Age: 61
End: 2021-07-21
Attending: OPHTHALMOLOGY
Payer: COMMERCIAL

## 2021-07-21 DIAGNOSIS — Z96.1 PSEUDOPHAKIA OF BOTH EYES: ICD-10-CM

## 2021-07-21 DIAGNOSIS — H40.051 BORDERLINE GLAUCOMA OF RIGHT EYE WITH OCULAR HYPERTENSION: Primary | ICD-10-CM

## 2021-07-21 DIAGNOSIS — F32.0 MILD MAJOR DEPRESSION (H): ICD-10-CM

## 2021-07-21 PROCEDURE — G0463 HOSPITAL OUTPT CLINIC VISIT: HCPCS

## 2021-07-21 PROCEDURE — 99214 OFFICE O/P EST MOD 30 MIN: CPT | Performed by: OPHTHALMOLOGY

## 2021-07-21 PROCEDURE — 92133 CPTRZD OPH DX IMG PST SGM ON: CPT | Performed by: OPHTHALMOLOGY

## 2021-07-21 ASSESSMENT — EXTERNAL EXAM - LEFT EYE: OS_EXAM: NORMAL

## 2021-07-21 ASSESSMENT — SLIT LAMP EXAM - LIDS
COMMENTS: NORMAL
COMMENTS: NORMAL

## 2021-07-21 ASSESSMENT — CONF VISUAL FIELD
OS_NORMAL: 1
OD_NORMAL: 1

## 2021-07-21 ASSESSMENT — TONOMETRY
OD_IOP_MMHG: 15
IOP_METHOD: APPLANATION
OS_IOP_MMHG: 16

## 2021-07-21 ASSESSMENT — CUP TO DISC RATIO
OS_RATIO: 0.6
OD_RATIO: 0.4

## 2021-07-21 ASSESSMENT — VISUAL ACUITY
OD_SC: 20/20
OS_SC: 20/20
METHOD: SNELLEN - LINEAR
CORRECTION_TYPE: CONTACTS
OS_SC+: -1

## 2021-07-21 ASSESSMENT — EXTERNAL EXAM - RIGHT EYE: OD_EXAM: NORMAL

## 2021-07-21 NOTE — NURSING NOTE
Chief Complaints and History of Present Illnesses   Patient presents with     Glaucoma Follow-Up     Chief Complaint(s) and History of Present Illness(es)     Glaucoma Follow-Up     Laterality: both eyes              Comments     Pt. States that he is doing well. No change in VA BE. No pain BE. No flashes or floaters BE.   Sandra Chiu COT 12:11 PM July 21, 2021

## 2021-07-21 NOTE — PROGRESS NOTES
Chief Complaint(s) and History of Present Illness(es)     Glaucoma Follow-Up     Laterality: both eyes              Comments     Pt. States that he is doing well. No change in VA BE. No pain BE. No   flashes or floaters BE.   Sandra Chiu COT 12:11 PM July 21, 2021               Review of systems for the eyes was negative other than the pertinent positives/negatives listed in the HPI.      Assessment & Plan      Stas Houston is a 61 year old male with the following diagnoses:   1. 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 Pseudophakia of both eyes    3. Mild major depression (H)         S/p CEIOL, both eyes (2019)  hx of OHTN left eye after trauma - reports multiple episodes of head trauma and black eyes as a teenager and young adult  mother and maybe grandmother with +hx of glaucoma    Here for dilated fundus exam and glaucoma recheck  Now using gtts with good compliance  Intraocular pressure acceptable both eyes  Dilated fundus exam and OCT Nerve fiber layer stable     No evidence of glaucoma changes at this time  Moderate risk given previous intraocular pressure and trauma history   Early posterior capsular opacity (PCO), not visually significant at this time      Continue timolol every morning each eye  Continue latanoprost at bedtime each eye  Return precautions reviewed       Patient disposition:   Return in about 1 year (around 7/21/2022) for DFE, 24-2 Dynamic VF, OCT NFL.           Attending Physician Attestation:  Complete documentation of historical and exam elements from today's encounter can be found in the full encounter summary report (not reduplicated in this progress note).  I personally obtained the chief complaint(s) and history of present illness.  I confirmed and edited as necessary the review of systems, past medical/surgical history, family history, social history, and examination findings as documented by others; and I examined the patient myself.  I personally reviewed  the relevant tests, images, and reports as documented above.  I formulated and edited as necessary the assessment and plan and discussed the findings and management plan with the patient and family. . - Froilan Garnett MD

## 2021-12-15 DIAGNOSIS — H40.051 BORDERLINE GLAUCOMA OF RIGHT EYE WITH OCULAR HYPERTENSION: ICD-10-CM

## 2021-12-15 DIAGNOSIS — H40.052 BORDERLINE GLAUCOMA OF LEFT EYE WITH OCULAR HYPERTENSION: ICD-10-CM

## 2021-12-15 RX ORDER — TIMOLOL MALEATE 5 MG/ML
1 SOLUTION/ DROPS OPHTHALMIC DAILY
Qty: 5 ML | Refills: 7 | Status: SHIPPED | OUTPATIENT
Start: 2021-12-15 | End: 2021-12-20

## 2021-12-15 RX ORDER — LATANOPROST 50 UG/ML
1 SOLUTION/ DROPS OPHTHALMIC AT BEDTIME
Qty: 2.5 ML | Refills: 7 | Status: SHIPPED | OUTPATIENT
Start: 2021-12-15 | End: 2021-12-20

## 2021-12-15 NOTE — TELEPHONE ENCOUNTER
[facility] Call Center    Phone Message    May a detailed message be left on voicemail: yes     Reason for Call: Medication Refill Request    Has the patient contacted the pharmacy for the refill? Yes   Name of medication being requested: latanoprost (XALATAN) 0.005 % ophthalmic solution, timolol maleate (TIMOPTIC) 0.5 % ophthalmic solution   Provider who prescribed the medication: Dr. Garnett  Pharmacy: [facility]  Date medication is needed: 12/15/21         Action Taken: Message routed to:  [facility] eye

## 2021-12-15 NOTE — TELEPHONE ENCOUNTER
latanoprost (XALATAN) 0.005 % ophthalmic solution  Last Written Prescription Date:  1/20/2021  Last Fill Quantity: 2.5,   # refills: 6  Last Office Visit : 7/21/2021  Future Office visit:  7/27/2022     Frolian Garnett MD    Ophthalmology        Continue timolol every morning each eye  Continue latanoprost at bedtime each eye  Return precautions reviewed      Patient disposition:   Return in about 1 year (around 7/21/2022) for DFE, 24-2 Dynamic VF, OCT NFL.    2.5 mL, 7 Refills sent to pharm     timolol maleate (TIMOPTIC) 0.5 % ophthalmic solution  Last Written Prescription Date:  1/20/2021  Last Fill Quantity: 2.5,   # refills: 6  Last Office Visit : 7/21/2021  Future Office visit:  7/27/2022  5 mL, 7 Refills sent to pharm       Sheela Morales RN  Central Triage Red Flags/Med Refills

## 2021-12-20 ENCOUNTER — TELEPHONE (OUTPATIENT)
Dept: OPHTHALMOLOGY | Facility: CLINIC | Age: 61
End: 2021-12-20
Payer: COMMERCIAL

## 2021-12-20 DIAGNOSIS — H40.052 BORDERLINE GLAUCOMA OF LEFT EYE WITH OCULAR HYPERTENSION: ICD-10-CM

## 2021-12-20 DIAGNOSIS — H40.051 BORDERLINE GLAUCOMA OF RIGHT EYE WITH OCULAR HYPERTENSION: ICD-10-CM

## 2021-12-20 RX ORDER — TIMOLOL MALEATE 5 MG/ML
1 SOLUTION/ DROPS OPHTHALMIC DAILY
Qty: 5 ML | Refills: 7 | Status: SHIPPED | OUTPATIENT
Start: 2021-12-20 | End: 2022-08-10

## 2021-12-20 RX ORDER — LATANOPROST 50 UG/ML
1 SOLUTION/ DROPS OPHTHALMIC AT BEDTIME
Qty: 2.5 ML | Refills: 7 | Status: SHIPPED | OUTPATIENT
Start: 2021-12-20 | End: 2022-08-10

## 2021-12-20 NOTE — TELEPHONE ENCOUNTER
[facility] Call Center    Phone Message    May a detailed message be left on voicemail: yes     Reason for Call: Medication Question or concern regarding medication   Prescription Clarification  Name of Medication: LATANOPROST, and TIMOLOL   Prescribing Provider: gisele   Pharmacy: [facility].   Ph #: [phone], fax #: [phone]   What on the order needs clarification? Rx was sent over to wrong pharmacy. Please refax rx to pharmacy above.       Action Taken: Other: eye     Travel Screening: Not Applicable

## 2021-12-20 NOTE — TELEPHONE ENCOUNTER
latanoprost (XALATAN) 0.005 % ophthalmic solution  & timolol maleate (TIMOPTIC) 0.5 % ophthalmic solution    Sent orders to updated pharmacy for Pt care.    Sheela Morales RN  Central Triage Red Flags/Med Refills

## 2022-02-21 ENCOUNTER — TELEPHONE (OUTPATIENT)
Dept: OPHTHALMOLOGY | Facility: CLINIC | Age: 62
End: 2022-02-21
Payer: COMMERCIAL

## 2022-08-08 DIAGNOSIS — H40.051 BORDERLINE GLAUCOMA OF RIGHT EYE WITH OCULAR HYPERTENSION: Primary | ICD-10-CM

## 2022-08-08 DIAGNOSIS — H40.052 BORDERLINE GLAUCOMA OF LEFT EYE WITH OCULAR HYPERTENSION: ICD-10-CM

## 2022-08-10 ENCOUNTER — OFFICE VISIT (OUTPATIENT)
Dept: OPHTHALMOLOGY | Facility: CLINIC | Age: 62
End: 2022-08-10
Attending: OPHTHALMOLOGY
Payer: COMMERCIAL

## 2022-08-10 DIAGNOSIS — Z96.1 PSEUDOPHAKIA OF BOTH EYES: Primary | ICD-10-CM

## 2022-08-10 DIAGNOSIS — H40.051 BORDERLINE GLAUCOMA OF RIGHT EYE WITH OCULAR HYPERTENSION: ICD-10-CM

## 2022-08-10 DIAGNOSIS — H40.052 BORDERLINE GLAUCOMA OF LEFT EYE WITH OCULAR HYPERTENSION: ICD-10-CM

## 2022-08-10 PROCEDURE — 92133 CPTRZD OPH DX IMG PST SGM ON: CPT | Performed by: OPHTHALMOLOGY

## 2022-08-10 PROCEDURE — 99214 OFFICE O/P EST MOD 30 MIN: CPT | Performed by: OPHTHALMOLOGY

## 2022-08-10 PROCEDURE — 92083 EXTENDED VISUAL FIELD XM: CPT | Performed by: OPHTHALMOLOGY

## 2022-08-10 PROCEDURE — G0463 HOSPITAL OUTPT CLINIC VISIT: HCPCS | Mod: 25

## 2022-08-10 RX ORDER — LATANOPROST 50 UG/ML
1 SOLUTION/ DROPS OPHTHALMIC AT BEDTIME
Qty: 2.5 ML | Refills: 7 | Status: SHIPPED | OUTPATIENT
Start: 2022-08-10 | End: 2023-08-16

## 2022-08-10 RX ORDER — TIMOLOL MALEATE 5 MG/ML
1 SOLUTION/ DROPS OPHTHALMIC DAILY
Qty: 5 ML | Refills: 7 | Status: SHIPPED | OUTPATIENT
Start: 2022-08-10 | End: 2023-08-16

## 2022-08-10 ASSESSMENT — CUP TO DISC RATIO
OD_RATIO: 0.4
OS_RATIO: 0.6

## 2022-08-10 ASSESSMENT — VISUAL ACUITY
OD_SC: 20/20
OS_SC+: -2
METHOD: SNELLEN - LINEAR
OS_SC: 20/20

## 2022-08-10 ASSESSMENT — TONOMETRY
OS_IOP_MMHG: 19
IOP_METHOD: TONOPEN
OD_IOP_MMHG: 19

## 2022-08-10 ASSESSMENT — EXTERNAL EXAM - LEFT EYE: OS_EXAM: NORMAL

## 2022-08-10 ASSESSMENT — CONF VISUAL FIELD
OS_NORMAL: 1
OD_NORMAL: 1

## 2022-08-10 ASSESSMENT — EXTERNAL EXAM - RIGHT EYE: OD_EXAM: NORMAL

## 2022-08-10 NOTE — NURSING NOTE
Chief Complaints and History of Present Illnesses   Patient presents with     Glaucoma Follow-Up     Chief Complaint(s) and History of Present Illness(es)     Glaucoma Follow-Up     Laterality: both eyes              Comments     Pt. States that he is doing well. No change in VA BE. No pain BE. No flashes or floaters BE.   Sandra Chiu COT 12:26 PM August 10, 2022

## 2022-08-10 NOTE — PROGRESS NOTES
Chief Complaint(s) and History of Present Illness(es)     Glaucoma Follow-Up     Laterality: both eyes              Comments     Pt. States that he is doing well. No change in VA BE. No pain BE. No   flashes or floaters BE.   Sandra Chiu COT 12:26 PM August 10, 2022               Review of systems for the eyes was negative other than the pertinent positives/negatives listed in the HPI.      Assessment & Plan      Stas Houston is a 62 year old male with the following diagnoses:   1. Pseudophakia of both eyes    2. 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            S/p CEIOL, both eyes (2019) - Buchanan  hx of OHTN left eye after trauma - reports multiple episodes of head trauma and black eyes as a teenager and young adult  mother and maybe grandmother with +hx of glaucoma     Annual exam today, no concerns or vision change    Intraocular pressure acceptable both eyes  Exam, visual field and OCT nerve fiber layer stable     No evidence of glaucoma changes at this time  Moderate risk given previous intraocular pressure and trauma history   Posterior capsular opacity (PCO), not visually significant, monitor      Continue timolol every morning each eye  Continue latanoprost at bedtime each eye  Start artificial tears twice a day, warm compresses as needed   Return precautions reviewed       Patient disposition:   Return in about 1 year (around 8/10/2023) for DFE + OCT NFL with Dr. Deluna.           Attending Physician Attestation:  Complete documentation of historical and exam elements from today's encounter can be found in the full encounter summary report (not reduplicated in this progress note).  I personally obtained the chief complaint(s) and history of present illness.  I confirmed and edited as necessary the review of systems, past medical/surgical history, family history, social history, and examination findings as documented by  others; and I examined the patient myself.  I personally reviewed the relevant tests, images, and reports as documented above.  I formulated and edited as necessary the assessment and plan and discussed the findings and management plan with the patient and family. . - Froilan Garnett MD

## 2023-08-16 ENCOUNTER — TELEPHONE (OUTPATIENT)
Dept: OPHTHALMOLOGY | Facility: CLINIC | Age: 63
End: 2023-08-16
Payer: COMMERCIAL

## 2023-08-16 DIAGNOSIS — H40.051 BORDERLINE GLAUCOMA OF RIGHT EYE WITH OCULAR HYPERTENSION: ICD-10-CM

## 2023-08-16 DIAGNOSIS — H40.052 BORDERLINE GLAUCOMA OF LEFT EYE WITH OCULAR HYPERTENSION: ICD-10-CM

## 2023-08-16 RX ORDER — LATANOPROST 50 UG/ML
1 SOLUTION/ DROPS OPHTHALMIC AT BEDTIME
Qty: 2.5 ML | Refills: 1 | Status: SHIPPED | OUTPATIENT
Start: 2023-08-16 | End: 2023-09-06

## 2023-08-16 RX ORDER — TIMOLOL MALEATE 5 MG/ML
1 SOLUTION/ DROPS OPHTHALMIC DAILY
Qty: 5 ML | Refills: 1 | Status: SHIPPED | OUTPATIENT
Start: 2023-08-16 | End: 2023-09-06

## 2023-08-16 NOTE — TELEPHONE ENCOUNTER
timolol maleate (TIMOPTIC) 0.5 % ophthalmic solution   Last Written Prescription Date:  8/10/2022  Last Fill Quantity: 5,   # refills: 7  Last Office Visit : 8/10/2022  Future Office visit:  None      Froilan Garnett MD  Ophthalmology     Assessment & Plan   Assessment & Plan   Stas Houston is a 62 year old male with the following diagnoses:   1. Pseudophakia of both eyes    2. 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             S/p CEIOL, both eyes (2019) - [facility]  hx of OHTN left eye after trauma - reports multiple episodes of head trauma and black eyes as a teenager and young adult  mother and maybe grandmother with +hx of glaucoma     Annual exam today, no concerns or vision change     Intraocular pressure acceptable both eyes  Exam, visual field and OCT nerve fiber layer stable     No evidence of glaucoma changes at this time  Moderate risk given previous intraocular pressure and trauma history   Posterior capsular opacity (PCO), not visually significant, monitor      Continue timolol every morning each eye  Continue latanoprost at bedtime each eye  Start artificial tears twice a day, warm compresses as needed   Return precautions reviewed         Patient disposition:   Return in about 1 year (around 8/10/2023) for DFE + OCT NFL with Dr. Deluna.    Routing refill request to provider for review/approval because:  5 mL, 1 Refill sent to pharm  Pt needs updated office visit  Please call Pt to schedule      latanoprost (XALATAN) 0.005 % ophthalmic solution  Last Written Prescription Date:  8/10/2022  Last Fill Quantity: 5,   # refills: 7  Last Office Visit : 8/10/2022  Future Office visit:  None  2.5 mL, 1 Refill sent to pharm  Pt needs updated office visit  Please call Pt to schedule    Sheela Morales RN  Central Triage Red Flags/Med Refills

## 2023-08-28 DIAGNOSIS — H40.052 BORDERLINE GLAUCOMA OF LEFT EYE WITH OCULAR HYPERTENSION: Primary | ICD-10-CM

## 2023-09-01 DIAGNOSIS — H40.051 BORDERLINE GLAUCOMA OF RIGHT EYE WITH OCULAR HYPERTENSION: Primary | ICD-10-CM

## 2023-09-01 DIAGNOSIS — H40.052 BORDERLINE GLAUCOMA OF LEFT EYE WITH OCULAR HYPERTENSION: ICD-10-CM

## 2023-09-06 ENCOUNTER — OFFICE VISIT (OUTPATIENT)
Dept: OPHTHALMOLOGY | Facility: CLINIC | Age: 63
End: 2023-09-06
Attending: OPHTHALMOLOGY
Payer: COMMERCIAL

## 2023-09-06 DIAGNOSIS — H40.051 BORDERLINE GLAUCOMA OF RIGHT EYE WITH OCULAR HYPERTENSION: ICD-10-CM

## 2023-09-06 DIAGNOSIS — H40.052 BORDERLINE GLAUCOMA OF LEFT EYE WITH OCULAR HYPERTENSION: ICD-10-CM

## 2023-09-06 DIAGNOSIS — Z96.1 PSEUDOPHAKIA OF BOTH EYES: Primary | ICD-10-CM

## 2023-09-06 PROCEDURE — 92014 COMPRE OPH EXAM EST PT 1/>: CPT | Performed by: OPHTHALMOLOGY

## 2023-09-06 PROCEDURE — 92133 CPTRZD OPH DX IMG PST SGM ON: CPT | Performed by: OPHTHALMOLOGY

## 2023-09-06 PROCEDURE — G0463 HOSPITAL OUTPT CLINIC VISIT: HCPCS | Performed by: OPHTHALMOLOGY

## 2023-09-06 RX ORDER — LATANOPROST 50 UG/ML
1 SOLUTION/ DROPS OPHTHALMIC AT BEDTIME
Qty: 2.5 ML | Refills: 6 | Status: SHIPPED | OUTPATIENT
Start: 2023-09-06

## 2023-09-06 RX ORDER — TIMOLOL MALEATE 5 MG/ML
1 SOLUTION/ DROPS OPHTHALMIC DAILY
Qty: 5 ML | Refills: 3 | Status: SHIPPED | OUTPATIENT
Start: 2023-09-06 | End: 2024-03-09

## 2023-09-06 ASSESSMENT — CONF VISUAL FIELD
OD_INFERIOR_TEMPORAL_RESTRICTION: 0
OS_NORMAL: 1
OS_SUPERIOR_NASAL_RESTRICTION: 0
OD_INFERIOR_NASAL_RESTRICTION: 0
OS_SUPERIOR_TEMPORAL_RESTRICTION: 0
OS_INFERIOR_TEMPORAL_RESTRICTION: 0
OD_SUPERIOR_NASAL_RESTRICTION: 0
OS_INFERIOR_NASAL_RESTRICTION: 0
METHOD: COUNTING FINGERS
OD_SUPERIOR_TEMPORAL_RESTRICTION: 0
OD_NORMAL: 1

## 2023-09-06 ASSESSMENT — CUP TO DISC RATIO
OD_RATIO: 0.4
OS_RATIO: 0.6

## 2023-09-06 ASSESSMENT — VISUAL ACUITY
OS_SC: 20/20
OD_SC: 20/20
OD_SC+: -1
METHOD: SNELLEN - LINEAR
OS_SC+: -3

## 2023-09-06 ASSESSMENT — EXTERNAL EXAM - LEFT EYE: OS_EXAM: NORMAL

## 2023-09-06 ASSESSMENT — TONOMETRY
OS_IOP_MMHG: 17
OD_IOP_MMHG: 19
IOP_METHOD: ICARE

## 2023-09-06 ASSESSMENT — EXTERNAL EXAM - RIGHT EYE: OD_EXAM: NORMAL

## 2023-09-06 NOTE — PROGRESS NOTES
Chief Complaint(s) and History of Present Illness(es)     Glaucoma Follow-Up            Associated symptoms: Negative for eye pain, flashes and floaters    Comments: Glaucoma follow up           Comments    Pt states eyes seem ok. Pt states he has started to use reading glasses.   Pt states no pain, flashes, or floaters.  Per pt using Timolol and Latanoprost     Desi Jamie OA 3:11 PM September 6, 2023                Review of systems for the eyes was negative other than the pertinent positives/negatives listed in the HPI.      Assessment & Plan      Stas Houston is a 63 year old male with the following diagnoses:   1. Pseudophakia of both eyes    2. 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         S/p CEIOL, both eyes (2019) - [place]  hx of OHTN left eye after trauma - reports multiple episodes of head trauma and black eyes as a teenager and young adult  Mother and maybe grandmother with +hx of glaucoma     Annual exam today, no concerns or vision change  Intraocular pressure remains within normal limits both eyes   No evidence of glaucoma changes at this time  Moderate risk given previous intraocular pressure and trauma history        Continue timolol every morning each eye  Continue latanoprost at bedtime each eye  Artificial tears twice a day, warm compresses as needed   Return precautions reviewed       Patient disposition:   Return in about 1 year (around 9/6/2024) for YUMIKO Deluna.           Attending Physician Attestation:  Complete documentation of historical and exam elements from today's encounter can be found in the full encounter summary report (not reduplicated in this progress note).  I personally obtained the chief complaint(s) and history of present illness.  I confirmed and edited as necessary the review of systems, past medical/surgical history, family history, social history, and examination findings as documented  by others; and I examined the patient myself.  I personally reviewed the relevant tests, images, and reports as documented above.  I formulated and edited as necessary the assessment and plan and discussed the findings and management plan with the patient and family. . - Froilan Garnett MD

## 2023-09-06 NOTE — NURSING NOTE
Chief Complaints and History of Present Illnesses   Patient presents with    Glaucoma Follow-Up     Glaucoma follow up      Chief Complaint(s) and History of Present Illness(es)       Glaucoma Follow-Up              Associated symptoms: Negative for eye pain, flashes and floaters    Comments: Glaucoma follow up               Comments    Pt states eyes seem ok. Pt states he has started to use reading glasses. Pt states no pain, flashes, or floaters.  Per pt using Timolol and Latanoprost     Desi Ayala OA 3:11 PM September 6, 2023

## 2024-03-08 DIAGNOSIS — H40.052 BORDERLINE GLAUCOMA OF LEFT EYE WITH OCULAR HYPERTENSION: ICD-10-CM

## 2024-03-12 RX ORDER — TIMOLOL MALEATE 5 MG/ML
1 SOLUTION/ DROPS OPHTHALMIC DAILY
Qty: 5 ML | Refills: 4 | Status: SHIPPED | OUTPATIENT
Start: 2024-03-12

## 2024-03-12 NOTE — TELEPHONE ENCOUNTER
timolol maleate (TIMOPTIC) 0.5 % ophthalmic solution   Last Written Prescription Date:  9/6/2023  Last Fill Quantity: 5,   # refills: 3  Last Office Visit : 9/6/2023  Future Office visit:  9/11/2024  5 mL, 4 Refills sent to jadiel Morales RN  Central Triage Red Flags/Med Refills

## 2024-08-12 DIAGNOSIS — H40.052 BORDERLINE GLAUCOMA OF LEFT EYE WITH OCULAR HYPERTENSION: ICD-10-CM

## 2024-08-12 DIAGNOSIS — D31.32 CHOROIDAL NEVUS OF LEFT EYE: ICD-10-CM

## 2024-08-12 DIAGNOSIS — H40.003 GLAUCOMA SUSPECT OF BOTH EYES: Primary | ICD-10-CM

## 2024-08-12 PROBLEM — R35.0 BENIGN PROSTATIC HYPERPLASIA WITH URINARY FREQUENCY: Status: ACTIVE | Noted: 2020-09-03

## 2024-08-12 PROBLEM — M54.12 CERVICAL RADICULOPATHY: Status: ACTIVE | Noted: 2023-08-21

## 2024-08-12 PROBLEM — G62.9 NEUROPATHY: Status: ACTIVE | Noted: 2019-10-30

## 2024-08-12 PROBLEM — E66.811 OBESITY (BMI 30.0-34.9): Status: ACTIVE | Noted: 2019-10-30

## 2024-08-12 PROBLEM — N40.1 BENIGN PROSTATIC HYPERPLASIA WITH URINARY FREQUENCY: Status: ACTIVE | Noted: 2020-09-03

## 2025-07-12 ENCOUNTER — HEALTH MAINTENANCE LETTER (OUTPATIENT)
Age: 65
End: 2025-07-12

## (undated) DEVICE — EYE KNIFE STILETTO VISITEC 1.1MM ANG 45DEG SIDEPORT 376620

## (undated) DEVICE — PACK CATARACT CUSTOM ASC SEY15CPUMC

## (undated) DEVICE — GLOVE PROTEXIS MICRO 6.0  2D73PM60

## (undated) DEVICE — EYE PACK CUSTOM ANTERIOR 30DEG TIP CENTURION PPK6682-04

## (undated) DEVICE — GLOVE PROTEXIS MICRO 6.5  2D73PM65

## (undated) DEVICE — EYE CANN IRR 30GA  ANTERIOR CHAMBER 581273

## (undated) DEVICE — EYE CANN IRR 25GA CYSTOTOME 581610

## (undated) DEVICE — EYE SHIELD PLASTIC

## (undated) DEVICE — EYE KNIFE SLIT XSTAR VISITEC 2.4MM 45DEG BEVEL UP 373724

## (undated) DEVICE — EYE TIP IRRIGATION & ASPIRATION POLYMER 35D BENT 8065751511

## (undated) DEVICE — LINEN TOWEL PACK X5 5464

## (undated) RX ORDER — ERYTHROMYCIN 5 MG/G
OINTMENT OPHTHALMIC
Status: DISPENSED
Start: 2019-05-29

## (undated) RX ORDER — ACETAMINOPHEN 325 MG/1
TABLET ORAL
Status: DISPENSED
Start: 2019-05-08

## (undated) RX ORDER — LIDOCAINE HYDROCHLORIDE 20 MG/ML
INJECTION, SOLUTION EPIDURAL; INFILTRATION; INTRACAUDAL; PERINEURAL
Status: DISPENSED
Start: 2019-05-29

## (undated) RX ORDER — ACETAMINOPHEN 325 MG/1
TABLET ORAL
Status: DISPENSED
Start: 2019-05-29

## (undated) RX ORDER — HEPARIN SOD,PORCINE/0.9 % NACL 5K/1000 ML
INTRAVENOUS SOLUTION INTRAVENOUS
Status: DISPENSED
Start: 2019-05-03

## (undated) RX ORDER — DEXAMETHASONE SODIUM PHOSPHATE 10 MG/ML
INJECTION, SOLUTION INTRAMUSCULAR; INTRAVENOUS
Status: DISPENSED
Start: 2019-06-07

## (undated) RX ORDER — LIDOCAINE HYDROCHLORIDE AND EPINEPHRINE 10; 10 MG/ML; UG/ML
INJECTION, SOLUTION INFILTRATION; PERINEURAL
Status: DISPENSED
Start: 2019-05-29

## (undated) RX ORDER — FENTANYL CITRATE 50 UG/ML
INJECTION, SOLUTION INTRAMUSCULAR; INTRAVENOUS
Status: DISPENSED
Start: 2019-05-08

## (undated) RX ORDER — TETRACAINE HYDROCHLORIDE 5 MG/ML
SOLUTION OPHTHALMIC
Status: DISPENSED
Start: 2019-05-29

## (undated) RX ORDER — LIDOCAINE HYDROCHLORIDE 10 MG/ML
INJECTION, SOLUTION EPIDURAL; INFILTRATION; INTRACAUDAL; PERINEURAL
Status: DISPENSED
Start: 2019-06-07

## (undated) RX ORDER — PROPARACAINE HYDROCHLORIDE 5 MG/ML
SOLUTION/ DROPS OPHTHALMIC
Status: DISPENSED
Start: 2019-05-08

## (undated) RX ORDER — PROPOFOL 10 MG/ML
INJECTION, EMULSION INTRAVENOUS
Status: DISPENSED
Start: 2019-05-29

## (undated) RX ORDER — LIDOCAINE HYDROCHLORIDE 10 MG/ML
INJECTION, SOLUTION EPIDURAL; INFILTRATION; INTRACAUDAL; PERINEURAL
Status: DISPENSED
Start: 2019-05-03

## (undated) RX ORDER — FENTANYL CITRATE 50 UG/ML
INJECTION, SOLUTION INTRAMUSCULAR; INTRAVENOUS
Status: DISPENSED
Start: 2019-05-29